# Patient Record
Sex: FEMALE | Race: BLACK OR AFRICAN AMERICAN | NOT HISPANIC OR LATINO | Employment: OTHER | URBAN - METROPOLITAN AREA
[De-identification: names, ages, dates, MRNs, and addresses within clinical notes are randomized per-mention and may not be internally consistent; named-entity substitution may affect disease eponyms.]

---

## 2017-06-14 ENCOUNTER — OFFICE VISIT (OUTPATIENT)
Dept: OBGYN | Facility: CLINIC | Age: 27
End: 2017-06-14
Payer: COMMERCIAL

## 2017-06-14 VITALS
WEIGHT: 187.2 LBS | BODY MASS INDEX: 28.37 KG/M2 | TEMPERATURE: 98.2 F | SYSTOLIC BLOOD PRESSURE: 120 MMHG | HEIGHT: 68 IN | DIASTOLIC BLOOD PRESSURE: 80 MMHG

## 2017-06-14 DIAGNOSIS — Z13.220 SCREENING FOR LIPOID DISORDERS: ICD-10-CM

## 2017-06-14 DIAGNOSIS — Z01.419 ENCOUNTER FOR GYNECOLOGICAL EXAMINATION WITHOUT ABNORMAL FINDING: Primary | ICD-10-CM

## 2017-06-14 DIAGNOSIS — N84.1 CERVICAL POLYP: ICD-10-CM

## 2017-06-14 LAB
ERYTHROCYTE [DISTWIDTH] IN BLOOD BY AUTOMATED COUNT: 13.3 % (ref 10–15)
HCT VFR BLD AUTO: 42 % (ref 35–47)
HGB BLD-MCNC: 14 G/DL (ref 11.7–15.7)
MCH RBC QN AUTO: 29.2 PG (ref 26.5–33)
MCHC RBC AUTO-ENTMCNC: 33.3 G/DL (ref 31.5–36.5)
MCV RBC AUTO: 88 FL (ref 78–100)
PLATELET # BLD AUTO: 254 10E9/L (ref 150–450)
RBC # BLD AUTO: 4.79 10E12/L (ref 3.8–5.2)
WBC # BLD AUTO: 5 10E9/L (ref 4–11)

## 2017-06-14 PROCEDURE — 88305 TISSUE EXAM BY PATHOLOGIST: CPT | Performed by: OBSTETRICS & GYNECOLOGY

## 2017-06-14 PROCEDURE — 99385 PREV VISIT NEW AGE 18-39: CPT | Mod: 25 | Performed by: OBSTETRICS & GYNECOLOGY

## 2017-06-14 PROCEDURE — 80061 LIPID PANEL: CPT | Performed by: OBSTETRICS & GYNECOLOGY

## 2017-06-14 PROCEDURE — G0145 SCR C/V CYTO,THINLAYER,RESCR: HCPCS | Performed by: OBSTETRICS & GYNECOLOGY

## 2017-06-14 PROCEDURE — 36415 COLL VENOUS BLD VENIPUNCTURE: CPT | Performed by: OBSTETRICS & GYNECOLOGY

## 2017-06-14 PROCEDURE — 85027 COMPLETE CBC AUTOMATED: CPT | Performed by: OBSTETRICS & GYNECOLOGY

## 2017-06-14 PROCEDURE — 57500 BIOPSY OF CERVIX: CPT | Performed by: OBSTETRICS & GYNECOLOGY

## 2017-06-14 NOTE — MR AVS SNAPSHOT
"              After Visit Summary   6/14/2017    Jono Gillis    MRN: [id number]           Patient Information     Date Of Birth          1990        Visit Information        Provider Department      6/14/2017 8:30 AM Radha Renteria MD [facility]        Today's Diagnoses     Encounter for gynecological examination without abnormal finding    -  1    Screening for lipoid disorders        Cervical polyp           Follow-ups after your visit        Who to contact     If you have questions or need follow up information about today's clinic visit or your schedule please contact [facility] directly at [phone].  Normal or non-critical lab and imaging results will be communicated to you by MyChart, letter or phone within 4 business days after the clinic has received the results. If you do not hear from us within 7 days, please contact the clinic through 7digitalhart or phone. If you have a critical or abnormal lab result, we will notify you by phone as soon as possible.  Submit refill requests through Superfly or call your pharmacy and they will forward the refill request to us. Please allow 3 business days for your refill to be completed.          Additional Information About Your Visit        MyChart Information     Superfly gives you secure access to your electronic health record. If you see a primary care provider, you can also send messages to your care team and make appointments. If you have questions, please call your primary care clinic.  If you do not have a primary care provider, please call [phone] and they will assist you.        Care EveryWhere ID     This is your Care EveryWhere ID. This could be used by other organizations to access your [place] medical records  [id number]        Your Vitals Were     Temperature Height Last Period BMI (Body Mass Index)          98.2  F (36.8  C) (Oral) 5' 8\" (1.727 m) 05/26/2017 28.46 kg/m2         Blood Pressure " from Last 3 Encounters:   06/14/17 120/80    Weight from Last 3 Encounters:   06/14/17 187 lb 3.2 oz (84.9 kg)              We Performed the Following     BIOPSY/EXCIS CERVICAL LESION W/WO FULGURATION     CBC with platelets     Lipid Profile     Pap imaged thin layer screen reflex to HPV if ASCUS - recommend age 25 - 29     Surgical pathology exam        Primary Care Provider    None Specified       No primary provider on file.        Thank you!     Thank you for choosing [facility]  for your care. Our goal is always to provide you with excellent care. Hearing back from our patients is one way we can continue to improve our services. Please take a few minutes to complete the written survey that you may receive in the mail after your visit with us. Thank you!             Your Updated Medication List - Protect others around you: Learn how to safely use, store and throw away your medicines at www.disposemymeds.org.      Notice  As of 6/14/2017  1:29 PM    You have not been prescribed any medications.

## 2017-06-14 NOTE — NURSING NOTE
Chief Complaint   Patient presents with     Physical     pap NIL Spring of 2015 at [facility]       Initial There were no vitals taken for this visit. There is no height or weight on file to calculate BMI.  BP completed using cuff size: regular        The following HM Due: NONE      The following patient reported/Care Every where data was sent to:  P ABSTRACT QUALITY INITIATIVES [[id number]]  Pap smear done on this date: spring of 2015 (approximately), by this group: [facility], results were NIL.      patient has appointment for today and orders have been placed

## 2017-06-14 NOTE — Clinical Note
Please abstract the following data from this visit with this patient into the appropriate field in Epic:  Pap smear done on this date: spring of 2015 (approximately), by this group: [facility], results were NIL.

## 2017-06-14 NOTE — PROGRESS NOTES
Jono is a 27 year old  female who presents for annual exam.     Menses are regular q 28-30 days and normal lasting 7 days.  Menses flow: normal.  Patient's last menstrual period was 2017.. Using condoms for contraception.  She is not currently considering pregnancy.  Besides routine health maintenance, she has no other health concerns today .    2016 and not planning kids until she turns 29 probably.   Cycles have a lot of cramping, mak first day of cycle where she tries to work from home if possible. Has noted some spotting just prior to cycle with last 2 menses.  Fasting today and would like cholesterol check.  GYNECOLOGIC HISTORY:  Menarche:   Jono is sexually active with 1male partner(s) and is currently in monogamous relationship.    History sexually transmitted infections:No STD history  STI testing offered?  Declined  MARTIN exposure: Unknown  History of abnormal Pap smear: NO - age 21-29 PAP every 3 years recommended  Family history of breast CA: Yes (Please explain): mgm and   Family history of uterine/ovarian CA: No    Family history of colon CA: No    HEALTH MAINTENANCE:  Cholesterol: (No results found for: CHOL History of abnormal lipids: No  Mammo: na . History of abnormal Mammo: Not applicable.  Regular Self Breast Exams: No  Calcium/Vitamin D intake: source:  none Adequate? No  TSH: (No results found for: TSH )  Pap; (No results found for: PAP )    HISTORY:  Obstetric History       T0      L0     SAB0   TAB0   Ectopic0   Multiple0   Live Births0         Past Medical History:   Diagnosis Date     Anxiety     homeopathic remedies, therapy     Past Surgical History:   Procedure Laterality Date     DENTAL SURGERY  2013    wisdom teeth     Family History   Problem Relation Age of Onset     Anxiety Disorder Mother      Depression Mother      Breast Cancer Maternal Grandmother      late 70's, not genetic     Breast Cancer Maternal Aunt      mid 50's     Anxiety  "Disorder Maternal Aunt      Depression Maternal Aunt      Social History     Social History     Marital status:      Spouse name: N/A     Number of children: N/A     Years of education: N/A     Social History Main Topics     Smoking status: Never Smoker     Smokeless tobacco: None     Alcohol use Yes      Comment: occ     Drug use: No     Sexual activity: Yes     Partners: Male     Birth control/ protection: Condom     Other Topics Concern     None     Social History Narrative     None     No current outpatient prescriptions on file.   No Known Allergies    Past medical, surgical, social and family history were reviewed and updated in EPIC.    EXAM:  /80  Temp 98.2  F (36.8  C) (Oral)  Ht 5' 8\" (1.727 m)  Wt 187 lb 3.2 oz (84.9 kg)  LMP 05/26/2017  BMI 28.46 kg/m2   BMI: Body mass index is 28.46 kg/(m^2).  Constitutional: healthy, alert and no distress  Head: Normocephalic. No masses, lesions, tenderness or abnormalities  Neck: Neck supple. Trachea midline. No adenopathy. Thyroid symmetric, normal size.   Cardiovascular: RRR.   Respiratory: Negative.   Breast: Breasts reveal mild symmetric fibrocystic densities, but there are no dominant, discrete, fixed or suspicious masses found.  Gastrointestinal: Abdomen soft, non-tender, non-distended. No masses, organomegaly.  :  Vulva:  No external lesions, normal female hair distribution, no inguinal adenopathy.    Urethra:  Midline, non-tender, well supported, no discharge  Vagina:  Moist, pink, no abnormal discharge, very large cervical polyp seen about 5 x 3 cm in vault  Uterus:  Normal size , non-tender, freely mobile  Ovaries:  No masses appreciated, non-tender, mobile  Rectal Exam: deferred  Musculoskeletal: extremities normal  Skin: no suspicious lesions or rashes  Psychiatric: Affect appropriate, cooperative,mentation appears normal.     Procedure:  After informed consent was obtained from the patient, betadine and then xylocaine 2% jelly was " applied to the polyp.  Ring forcep used to grasp the polyp and was twisted off. Specimen was submitted to pathology. Patient tolerated the procedure well. EBL minimal.  Silver nitrate used for hemostasis    COUNSELING:   Reviewed preventive health counseling, as reflected in patient instructions       Family planning   reports that she has never smoked. She does not have any smokeless tobacco history on file.    Body mass index is 28.46 kg/(m^2).  Weight management plan: not addressed today  FRAX Risk Assessment    ASSESSMENT:  27 year old female with satisfactory annual exam  (Z01.419) Encounter for gynecological examination without abnormal finding  (primary encounter diagnosis)  Comment: condoms  Plan: CBC with platelets, Pap imaged thin layer         screen reflex to HPV if ASCUS - recommend age         25 - 29        Discussed q3 yr pap smear.  Reviewed scheduled ibuprofen with menses for cramping.    (Z13.220) Screening for lipoid disorders  Comment: not done prior  Plan: Lipid Profile        Fasting today    (N84.1) Cervical polyp  Comment: very large  Plan: Surgical pathology exam, BIOPSY/EXCIS CERVICAL         LESION W/WO FULGURATION        Will let her know path results when available, but discussed likely cycles would be better after removed.  If still with spotting, then consider u/s to check endometrium for other polyps.    Radha Renteria MD

## 2017-06-15 LAB
CHOLEST SERPL-MCNC: 200 MG/DL
HDLC SERPL-MCNC: 72 MG/DL
LDLC SERPL CALC-MCNC: 119 MG/DL
NONHDLC SERPL-MCNC: 128 MG/DL
TRIGL SERPL-MCNC: 45 MG/DL

## 2017-06-16 LAB
COPATH REPORT: NORMAL
COPATH REPORT: NORMAL
PAP: NORMAL

## 2017-07-31 ENCOUNTER — MYC MEDICAL ADVICE (OUTPATIENT)
Dept: OBGYN | Facility: CLINIC | Age: 27
End: 2017-07-31

## 2017-07-31 ENCOUNTER — E-VISIT (OUTPATIENT)
Dept: OBGYN | Facility: CLINIC | Age: 27
End: 2017-07-31
Payer: COMMERCIAL

## 2017-07-31 DIAGNOSIS — F41.9 ANXIETY: Primary | ICD-10-CM

## 2017-07-31 PROCEDURE — 99444 ZZC PHYSICIAN ONLINE EVALUATION & MANAGEMENT SERVICE: CPT | Performed by: OBSTETRICS & GYNECOLOGY

## 2017-07-31 ASSESSMENT — ANXIETY QUESTIONNAIRES
7. FEELING AFRAID AS IF SOMETHING AWFUL MIGHT HAPPEN: NEARLY EVERY DAY
7. FEELING AFRAID AS IF SOMETHING AWFUL MIGHT HAPPEN: NEARLY EVERY DAY
6. BECOMING EASILY ANNOYED OR IRRITABLE: NEARLY EVERY DAY
GAD7 TOTAL SCORE: 20
4. TROUBLE RELAXING: NEARLY EVERY DAY
GAD7 TOTAL SCORE: 20
5. BEING SO RESTLESS THAT IT IS HARD TO SIT STILL: MORE THAN HALF THE DAYS
2. NOT BEING ABLE TO STOP OR CONTROL WORRYING: NEARLY EVERY DAY
3. WORRYING TOO MUCH ABOUT DIFFERENT THINGS: NEARLY EVERY DAY
GAD7 TOTAL SCORE: 20
1. FEELING NERVOUS, ANXIOUS, OR ON EDGE: NEARLY EVERY DAY

## 2017-07-31 ASSESSMENT — PATIENT HEALTH QUESTIONNAIRE - PHQ9
SUM OF ALL RESPONSES TO PHQ QUESTIONS 1-9: 20
10. IF YOU CHECKED OFF ANY PROBLEMS, HOW DIFFICULT HAVE THESE PROBLEMS MADE IT FOR YOU TO DO YOUR WORK, TAKE CARE OF THINGS AT HOME, OR GET ALONG WITH OTHER PEOPLE: EXTREMELY DIFFICULT
SUM OF ALL RESPONSES TO PHQ QUESTIONS 1-9: 20

## 2017-07-31 NOTE — TELEPHONE ENCOUNTER
Can we have her do an Evisit and also see if she can get in with [facility] therapist??  Then I can go over side effects and prescribe something for her.     (they have someone at  that does talk therapy)  I am not sure what the order is under for [facility]?    Thanks  Rdaha Renteria MD

## 2017-07-31 NOTE — TELEPHONE ENCOUNTER
No pharmacy on file    Weird as they are not supposed to able to submit this without a pharmacy.    Radha Renteria MD

## 2017-07-31 NOTE — TELEPHONE ENCOUNTER
Had patient fill out questionnaires.    PHQ-9 SCORE 7/31/2017   Total Score MyChart 20 (Severe depression)   Total Score 20     LALY-7 SCORE 7/31/2017   Total Score 20 (severe anxiety)   Total Score 20       Sade Duncan RN-BSN

## 2017-07-31 NOTE — MR AVS SNAPSHOT
After Visit Summary   7/31/2017    Jono Gillis    MRN: [id number]           Patient Information     Date Of Birth          1990        Visit Information        Provider Department      7/31/2017 11:50 AM Radha Renteria MD [facility]        Today's Diagnoses     Anxiety    -  1       Follow-ups after your visit        Who to contact     If you have questions or need follow up information about today's clinic visit or your schedule please contact [facility] directly at [phone].  Normal or non-critical lab and imaging results will be communicated to you by MtoVhart, letter or phone within 4 business days after the clinic has received the results. If you do not hear from us within 7 days, please contact the clinic through [employer] Multimediat or phone. If you have a critical or abnormal lab result, we will notify you by phone as soon as possible.  Submit refill requests through IDENT Technology or call your pharmacy and they will forward the refill request to us. Please allow 3 business days for your refill to be completed.          Additional Information About Your Visit        MyChart Information     IDENT Technology gives you secure access to your electronic health record. If you see a primary care provider, you can also send messages to your care team and make appointments. If you have questions, please call your primary care clinic.  If you do not have a primary care provider, please call [phone] and they will assist you.        Care EveryWhere ID     This is your Care EveryWhere ID. This could be used by other organizations to access your [place] medical records  [id number]         Blood Pressure from Last 3 Encounters:   06/14/17 120/80    Weight from Last 3 Encounters:   06/14/17 187 lb 3.2 oz (84.9 kg)              Today, you had the following     No orders found for display         Today's Medication Changes          These changes are accurate as of: 7/31/17 11:59  PM.  If you have any questions, ask your nurse or doctor.               Start taking these medicines.        Dose/Directions    sertraline 50 MG tablet   Commonly known as:  ZOLOFT   Used for:  Anxiety   Started by:  Radha Renteria MD        Take 1/2 tablet (25 mg) for 1-2 weeks, then increase to 1 tablet orally daily   Quantity:  30 tablet   Refills:  3            Where to get your medicines      These medications were sent to [facility] - [place], [place] - [address]  [address] [facility], [place] [address]     Phone:  [phone]     sertraline 50 MG tablet                Primary Care Provider    None Specified       No primary provider on file.        Equal Access to Services     [facility] SREEDHAR : Carolina Mixon, raffy mueller, jose ramon clark, sherrie foster. So [facility] [phone].    ATENCIÓN: Si habla español, tiene a connell disposición servicios gratuitos de asistencia lingüística. Llame al [phone].    We comply with applicable federal civil rights laws and [place] laws. We do not discriminate on the basis of race, color, national origin, age, disability sex, sexual orientation or gender identity.            Thank you!     Thank you for choosing [facility]  for your care. Our goal is always to provide you with excellent care. Hearing back from our patients is one way we can continue to improve our services. Please take a few minutes to complete the written survey that you may receive in the mail after your visit with us. Thank you!             Your Updated Medication List - Protect others around you: Learn how to safely use, store and throw away your medicines at www.disposemymeds.org.          This list is accurate as of: 7/31/17 11:59 PM.  Always use your most recent med list.                   Brand Name Dispense Instructions for use Diagnosis    sertraline 50 MG tablet    ZOLOFT    30 tablet    Take 1/2  tablet (25 mg) for 1-2 weeks, then increase to 1 tablet orally daily    Anxiety

## 2017-08-01 ASSESSMENT — ANXIETY QUESTIONNAIRES: GAD7 TOTAL SCORE: 20

## 2017-08-01 ASSESSMENT — PATIENT HEALTH QUESTIONNAIRE - PHQ9: SUM OF ALL RESPONSES TO PHQ QUESTIONS 1-9: 20

## 2017-08-23 ENCOUNTER — OFFICE VISIT (OUTPATIENT)
Dept: FAMILY MEDICINE | Facility: CLINIC | Age: 27
End: 2017-08-23
Payer: OTHER MISCELLANEOUS

## 2017-08-23 VITALS
TEMPERATURE: 97.7 F | OXYGEN SATURATION: 97 % | RESPIRATION RATE: 18 BRPM | BODY MASS INDEX: 28.51 KG/M2 | HEART RATE: 84 BPM | WEIGHT: 188.13 LBS | DIASTOLIC BLOOD PRESSURE: 87 MMHG | HEIGHT: 68 IN | SYSTOLIC BLOOD PRESSURE: 131 MMHG
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
## 2017-08-23 DIAGNOSIS — M54.2 NECK PAIN: Primary | ICD-10-CM

## 2017-08-23 PROCEDURE — 99214 OFFICE O/P EST MOD 30 MIN: CPT | Performed by: NURSE PRACTITIONER
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
## 2017-09-08 ENCOUNTER — TELEPHONE (OUTPATIENT)
Dept: FAMILY MEDICINE | Facility: CLINIC | Age: 27
End: 2017-09-08

## 2017-09-08 NOTE — TELEPHONE ENCOUNTER
Left message on answering machine for patient to call back so we can find out why she is out of work.  Paloma Rao RN

## 2017-09-08 NOTE — TELEPHONE ENCOUNTER
Reason for Call:  Form, our goal is to have forms completed with 72 hours, however, some forms may require a visit or additional information.    Type of letter, form or note:  work   Patient needs letter stating that she is able to return to work effective immediately.  Fax letter to [employer], fax #  [phone]  Atten:  Jael Abarca    Additional comments: Please call pt when form has been faxed or if you have any questions. OK to leave message on voicemail.    Call taken on 9/8/2017 at 1:59 PM by Jana Hardy
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
## 2017-09-08 NOTE — LETTER
[facility]   [address] [place], [place]  [address]  [phone]        September 13, 2017      RE: Jono Gillis  [address] [place] [address]        To whom it may concern:    Jono Gillis is under my professional care. She was seen and evaluated in the clinic on 8/23/17 and was cleared to return to work without restrictions on 8/24/17.    Please call if you have any questions.               Sincerely,    Roseanna Ramirez, GIOVANI/hsg

## 2018-08-07 ENCOUNTER — OFFICE VISIT (OUTPATIENT)
Dept: OBGYN | Facility: CLINIC | Age: 28
End: 2018-08-07
Payer: COMMERCIAL

## 2018-08-07 VITALS
WEIGHT: 192 LBS | DIASTOLIC BLOOD PRESSURE: 80 MMHG | BODY MASS INDEX: 29.1 KG/M2 | HEIGHT: 68 IN | SYSTOLIC BLOOD PRESSURE: 110 MMHG

## 2018-08-07 DIAGNOSIS — Z01.419 ENCOUNTER FOR GYNECOLOGICAL EXAMINATION WITHOUT ABNORMAL FINDING: Primary | ICD-10-CM

## 2018-08-07 DIAGNOSIS — Z23 NEED FOR TDAP VACCINATION: ICD-10-CM

## 2018-08-07 PROCEDURE — 90715 TDAP VACCINE 7 YRS/> IM: CPT | Performed by: OBSTETRICS & GYNECOLOGY

## 2018-08-07 PROCEDURE — 90471 IMMUNIZATION ADMIN: CPT | Performed by: OBSTETRICS & GYNECOLOGY

## 2018-08-07 PROCEDURE — 99395 PREV VISIT EST AGE 18-39: CPT | Mod: 25 | Performed by: OBSTETRICS & GYNECOLOGY

## 2018-08-07 ASSESSMENT — ANXIETY QUESTIONNAIRES
6. BECOMING EASILY ANNOYED OR IRRITABLE: SEVERAL DAYS
GAD7 TOTAL SCORE: 11
IF YOU CHECKED OFF ANY PROBLEMS ON THIS QUESTIONNAIRE, HOW DIFFICULT HAVE THESE PROBLEMS MADE IT FOR YOU TO DO YOUR WORK, TAKE CARE OF THINGS AT HOME, OR GET ALONG WITH OTHER PEOPLE: SOMEWHAT DIFFICULT
2. NOT BEING ABLE TO STOP OR CONTROL WORRYING: MORE THAN HALF THE DAYS
3. WORRYING TOO MUCH ABOUT DIFFERENT THINGS: MORE THAN HALF THE DAYS
5. BEING SO RESTLESS THAT IT IS HARD TO SIT STILL: NOT AT ALL
7. FEELING AFRAID AS IF SOMETHING AWFUL MIGHT HAPPEN: MORE THAN HALF THE DAYS
1. FEELING NERVOUS, ANXIOUS, OR ON EDGE: MORE THAN HALF THE DAYS

## 2018-08-07 ASSESSMENT — PATIENT HEALTH QUESTIONNAIRE - PHQ9: 5. POOR APPETITE OR OVEREATING: MORE THAN HALF THE DAYS

## 2018-08-07 NOTE — Clinical Note
Please check in with her via my chart in 6-8 weeks and make sure she scheduled a therapy appointment.  Connor 7 and PHQ 9 slightly increased.  Thanks! libia

## 2018-08-07 NOTE — PROGRESS NOTES
Jono is a 28 year old  female who presents for annual exam.     Menses are regular q 28-30 days and normal lasting 6 days.  Menses flow: normal.  Patient's last menstrual period was 2018.. Using condoms for contraception.  She is not currently considering pregnancy.  Not planning kids for a few more years.  Besides routine health maintenance,  she would like to discuss periods issues--wants to make sure she does not have another cervical polyp.  In 2017 she had gotten  started grad school in a new job in when I had seen her had high anxiety so was started on Zoloft.  She noticed on Zoloft she had an increase in panic attacks and was seeing a therapist 2017 but due to cost concerns had to stop.  By 2017 she did stop taking the Zoloft and did quit her job and since then has noted no more panic attacks.  She still having some issues in today felix 7=11 and PHQ 9=8  This is much less than last time I saw her.  She would like to avoid medication.  GYNECOLOGIC HISTORY:  Menarche:   Joon is sexually active with 1male partner(s) and is currently in monogamous relationship .    History sexually transmitted infections:No STD history  STI testing offered?  Declined  MARTIN exposure: Unknown  History of abnormal Pap smear: NO - age 21-29 PAP every 3 years recommended  Family history of breast CA: Yes (Please explain): mgm and m-aunt  Family history of uterine/ovarian CA: No    Family history of colon CA: No    HEALTH MAINTENANCE:  Cholesterol: (  Cholesterol   Date Value Ref Range Status   2017 200 (H) <200 mg/dL Final     Comment:     Desirable:       <200 mg/dl    History of abnormal lipids: Yes  Mammo: na . History of abnormal Mammo: Not applicable.  Regular Self Breast Exams: Yes  Calcium/Vitamin D intake: source:  none Adequate? No  TSH: (No results found for: TSH )  Pap; (  Lab Results   Component Value Date    PAP NIL 2017    )    HISTORY:  Obstetric History    G0    "P0   T0      L0     SAB0   TAB0   Ectopic0   Multiple0   Live Births0         Past Medical History:   Diagnosis Date     Anxiety     homeopathic remedies, therapy     Past Surgical History:   Procedure Laterality Date     DENTAL SURGERY  2013    wisdom teeth     Family History   Problem Relation Age of Onset     Anxiety Disorder Mother      Depression Mother      Breast Cancer Maternal Grandmother      late 70's, not genetic     Breast Cancer Maternal Aunt      mid 50's     Anxiety Disorder Maternal Aunt      Depression Maternal Aunt      Social History     Social History     Marital status:      Spouse name: N/A     Number of children: 0     Years of education: N/A     Occupational History     director of student programs      [employer]     Social History Main Topics     Smoking status: Never Smoker     Smokeless tobacco: Never Used     Alcohol use Yes      Comment: occ     Drug use: No     Sexual activity: Yes     Partners: Male     Birth control/ protection: Condom     Other Topics Concern     Not on file     Social History Narrative       Current Outpatient Prescriptions:      sertraline (ZOLOFT) 100 MG tablet, Take 1 tablet (100 mg) by mouth daily (Patient not taking: Reported on 2018), Disp: 90 tablet, Rfl: 3     sertraline (ZOLOFT) 50 MG tablet, Take 1/2 tablet (25 mg) for 1-2 weeks, then increase to 1 tablet orally daily (Patient not taking: Reported on 2018), Disp: 30 tablet, Rfl: 3   No Known Allergies    Past medical, surgical, social and family history were reviewed and updated in EPIC.      EXAM:  /80  Ht 5' 8\" (1.727 m)  Wt 192 lb (87.1 kg)  LMP 2018  BMI 29.19 kg/m2   BMI: Body mass index is 29.19 kg/(m^2).  Constitutional: healthy, alert and no distress  Head: Normocephalic. No masses, lesions, tenderness or abnormalities  Neck: Neck supple. Trachea midline. No adenopathy. Thyroid symmetric, normal size.   Cardiovascular: RRR.   Respiratory: " Negative.   Breast: Breasts reveal mild symmetric fibrocystic densities, but there are no dominant, discrete, fixed or suspicious masses found.  Gastrointestinal: Abdomen soft, non-tender, non-distended. No masses, organomegaly.  :  Vulva:  No external lesions, normal female hair distribution, no inguinal adenopathy.    Urethra:  Midline, non-tender, well supported, no discharge  Vagina:  Moist, pink, no abnormal discharge, no lesions  Uterus:  Normal size, no cervical polyp seen , non-tender, freely mobile  Ovaries:  No masses appreciated, non-tender, mobile  Rectal Exam: deferred  Musculoskeletal: extremities normal  Skin: no suspicious lesions or rashes  Psychiatric: Affect appropriate, cooperative,mentation appears normal.     COUNSELING:   Reviewed preventive health counseling, as reflected in patient instructions   reports that she has never smoked. She has never used smokeless tobacco.    Body mass index is 29.19 kg/(m^2).  Weight management plan: not discussed  FRAX Risk Assessment    ASSESSMENT:  28 year old female with satisfactory annual exam  (Z01.419) Encounter for gynecological examination without abnormal finding  (primary encounter diagnosis)  Comment: Condoms  Plan: Not due for Pap smear.  Discussed her anxiety which is less than last year.  She is willing to go see a therapist.  Will check back with her in 6-8 weeks and make sure she did schedule appointment for therapy.    (Z23) Need for Tdap vaccination  Plan: TDAP, IM (10 - 64 YRS) - Adacel, INJECTION         INTRAMUSCULAR OR SUB-Q    Radha Renteria MD

## 2018-08-07 NOTE — MR AVS SNAPSHOT
"              After Visit Summary   8/7/2018    Jono Gillis    MRN: [id number]           Patient Information     Date Of Birth          1990        Visit Information        Provider Department      8/7/2018 1:30 PM Radha Renteria MD [facility]        Today's Diagnoses     Encounter for gynecological examination without abnormal finding    -  1    Need for Tdap vaccination           Follow-ups after your visit        Who to contact     If you have questions or need follow up information about today's clinic visit or your schedule please contact [facility] directly at [phone].  Normal or non-critical lab and imaging results will be communicated to you by MyChart, letter or phone within 4 business days after the clinic has received the results. If you do not hear from us within 7 days, please contact the clinic through [employer] or phone. If you have a critical or abnormal lab result, we will notify you by phone as soon as possible.  Submit refill requests through [employer] or call your pharmacy and they will forward the refill request to us. Please allow 3 business days for your refill to be completed.          Additional Information About Your Visit        MyChart Information     [employer] gives you secure access to your electronic health record. If you see a primary care provider, you can also send messages to your care team and make appointments. If you have questions, please call your primary care clinic.  If you do not have a primary care provider, please call [phone] and they will assist you.        Care EveryWhere ID     This is your Care EveryWhere ID. This could be used by other organizations to access your [facility] medical records  [id number]        Your Vitals Were     Height Last Period BMI (Body Mass Index)             5' 8\" (1.727 m) 08/04/2018 29.19 kg/m2          Blood Pressure from Last 3 Encounters:   08/07/18 110/80   08/23/17 131/87 "   06/14/17 120/80    Weight from Last 3 Encounters:   08/07/18 192 lb (87.1 kg)   08/23/17 188 lb 2 oz (85.3 kg)   06/14/17 187 lb 3.2 oz (84.9 kg)              We Performed the Following     INJECTION INTRAMUSCULAR OR SUB-Q     TDAP, IM (10 - 64 YRS) - Adacel          Today's Medication Changes          These changes are accurate as of 8/7/18 11:59 PM.  If you have any questions, ask your nurse or doctor.               Stop taking these medicines if you haven't already. Please contact your care team if you have questions.     sertraline 100 MG tablet   Commonly known as:  ZOLOFT   Stopped by:  Radha Renteria MD           sertraline 50 MG tablet   Commonly known as:  ZOLOFT   Stopped by:  Radha Renteria MD                    Primary Care Provider Fax #    Physician No Ref-Primary [phone]       No address on file        Equal Access to Services     [facility] : Carolina Mixon, waaxda luvladislav, qaybta kaalmada adejovani, sherrie glaser . So [facility] [phone].    ATENCIÓN: Si habla español, tiene a connell disposición servicios gratuitos de asistencia lingüística. Llame al [phone].    We comply with applicable federal civil rights laws and [place] laws. We do not discriminate on the basis of race, color, national origin, age, disability, sex, sexual orientation, or gender identity.            Thank you!     Thank you for choosing [facility]  for your care. Our goal is always to provide you with excellent care. Hearing back from our patients is one way we can continue to improve our services. Please take a few minutes to complete the written survey that you may receive in the mail after your visit with us. Thank you!             Your Updated Medication List - Protect others around you: Learn how to safely use, store and throw away your medicines at www.disposemymeds.org.      Notice  As of 8/7/2018 11:59 PM    You have not been  prescribed any medications.

## 2018-08-07 NOTE — NURSING NOTE
"Chief Complaint   Patient presents with     Physical     Consult     some ? regarding periods       Initial /80  Ht 5' 8\" (1.727 m)  Wt 192 lb (87.1 kg)  LMP 2018  BMI 29.19 kg/m2 Estimated body mass index is 29.19 kg/(m^2) as calculated from the following:    Height as of this encounter: 5' 8\" (1.727 m).    Weight as of this encounter: 192 lb (87.1 kg).  BP completed using cuff size: regular        The following HM Due: NONE      The following patient reported/Care Every where data was sent to:  P ABSTRACT QUALITY INITIATIVES [[id number]]  NA      patient has appointment for today              "

## 2018-08-08 ASSESSMENT — ANXIETY QUESTIONNAIRES: GAD7 TOTAL SCORE: 11

## 2018-08-08 ASSESSMENT — PATIENT HEALTH QUESTIONNAIRE - PHQ9: SUM OF ALL RESPONSES TO PHQ QUESTIONS 1-9: 8

## 2018-10-08 ENCOUNTER — MYC MEDICAL ADVICE (OUTPATIENT)
Dept: NURSING | Facility: CLINIC | Age: 28
End: 2018-10-08

## 2019-01-03 ENCOUNTER — OFFICE VISIT (OUTPATIENT)
Dept: FAMILY MEDICINE | Facility: CLINIC | Age: 29
End: 2019-01-03
Payer: COMMERCIAL

## 2019-01-03 VITALS
TEMPERATURE: 98.1 F | SYSTOLIC BLOOD PRESSURE: 108 MMHG | HEART RATE: 102 BPM | BODY MASS INDEX: 28.28 KG/M2 | OXYGEN SATURATION: 98 % | DIASTOLIC BLOOD PRESSURE: 70 MMHG | WEIGHT: 186 LBS

## 2019-01-03 DIAGNOSIS — J06.9 VIRAL URI: Primary | ICD-10-CM

## 2019-01-03 PROCEDURE — 99213 OFFICE O/P EST LOW 20 MIN: CPT | Performed by: INTERNAL MEDICINE

## 2019-01-03 NOTE — PROGRESS NOTES
SUBJECTIVE:   Jono Gillis is a 28 year old female presenting with a chief complaint of   Chief Complaint   Patient presents with     Sweats     Pt in clinic to have eval for congestion, sore throat, fatigue, and chills.     Nasal Congestion     Pharyngitis     Fatigue       She is an established patient of [facility].    URI Adult    Onset of symptoms was 10 day(s) ago.  Course of illness is waxing and waning.   Now worse     Current and Associated symptoms: chills, stuffy nose, sore throat and headache  Runny nose, sneezy  Treatment measures tried include Tylenol/Ibuprofen and OTC Cough med.  Predisposing factors include ill contact: Work.        Review of Systems    Past Medical History:   Diagnosis Date     Anxiety     homeopathic remedies, therapy     Family History   Problem Relation Age of Onset     Anxiety Disorder Mother      Depression Mother      Breast Cancer Maternal Grandmother         late 70's, not genetic     Breast Cancer Maternal Aunt         mid 50's     Anxiety Disorder Maternal Aunt      Depression Maternal Aunt      Current Outpatient Medications   Medication Sig Dispense Refill     Pseudoephedrine-Ibuprofen  MG TABS        Social History     Tobacco Use     Smoking status: Never Smoker     Smokeless tobacco: Never Used   Substance Use Topics     Alcohol use: Yes     Comment: occ       OBJECTIVE  /70   Pulse 102   Temp 98.1  F (36.7  C) (Oral)   Wt 84.4 kg (186 lb)   SpO2 98%   BMI 28.28 kg/m      Physical Exam   Constitutional: She appears well-developed and well-nourished.   HENT:   Mouth/Throat: Oropharynx is clear and moist. No oropharyngeal exudate.   Tm nl B  PND  Sinus nontender    Cardiovascular: Normal rate, regular rhythm and normal heart sounds.   Pulmonary/Chest: Effort normal and breath sounds normal.   Vitals reviewed.      Labs:  No results found for this or any previous visit (from the past 24 hour(s)).    X-Ray was not done.    ASSESSMENT:      ICD-10-CM     1. Viral URI J06.9         Medical Decision Making:  Discussed appears to have new virus    PLAN:    URI Adult:  Ibuprofen, Fluids, Rest, Saline nasal spray and honey, menthol cough drops,     Followup:    If not improving or if condition worsens, follow up with your Primary Care Provider    Patient Instructions           Patient Education     Viral Upper Respiratory Illness (Adult)  You have a viral upper respiratory illness (URI), which is another term for the common cold. This illness is contagious during the first few days. It is spread through the air by coughing and sneezing. It may also be spread by direct contact (touching the sick person and then touching your own eyes, nose, or mouth). Frequent handwashing will decrease risk of spread. Most viral illnesses go away within 7 to 10 days with rest and simple home remedies. Sometimes the illness may last for several weeks. Antibiotics will not kill a virus, and they are generally not prescribed for this condition.    Home care    If symptoms are severe, rest at home for the first 2 to 3 days. When you resume activity, don't let yourself get too tired.    Don't smoke. If you need help stopping, talk with your healthcare provider.    Avoid being exposed to cigarette smoke (yours or others ).    You may use acetaminophen or ibuprofen to control pain and fever, unless another medicine was prescribed. If you have chronic liver or kidney disease, have ever had a stomach ulcer or gastrointestinal bleeding, or are taking blood-thinning medicines, talk with your healthcare provider before using these medicines. Aspirin should never be given to anyone under 18 years of age who is ill with a viral infection or fever. It may cause severe liver or brain damage.    Your appetite may be poor, so a light diet is fine. Stay well hydrated by drinking 6 to 8 glasses of fluids per day (water, soft drinks, juices, tea, or soup). Extra fluids will help loosen secretions in the  nose and lungs.    Over-the-counter cold medicines will not shorten the length of time you re sick, but they may be helpful for the following symptoms: cough, sore throat, and nasal and sinus congestion. If you take prescription medicines, ask your healthcare provider or pharmacist which over-the-counter medicines are safe to use. (Note: Don't use decongestants if you have high blood pressure.)  Follow-up care  Follow up with your healthcare provider, or as advised.  When to seek medical advice  Call your healthcare provider right away if any of these occur:    Cough with lots of colored sputum (mucus)    Severe headache; face, neck, or ear pain    Difficulty swallowing due to throat pain    Fever of 100.4 F (38 C) or higher, or as directed by your healthcare provider  Call 911  Call 911 if any of these occur:    Chest pain, shortness of breath, wheezing, or difficulty breathing    Coughing up blood    Very severe pain with swallowing, especially if it goes along with a muffled voice   Date Last Reviewed: 6/1/2018 2000-2018 [employer]. [address] [place] [address], [place] [address]. All rights reserved. This information is not intended as a substitute for professional medical care. Always follow your healthcare professional's instructions.

## 2019-09-03 ENCOUNTER — OFFICE VISIT (OUTPATIENT)
Dept: OBGYN | Facility: CLINIC | Age: 29
End: 2019-09-03
Payer: COMMERCIAL

## 2019-09-03 VITALS
DIASTOLIC BLOOD PRESSURE: 79 MMHG | WEIGHT: 179.2 LBS | HEART RATE: 80 BPM | SYSTOLIC BLOOD PRESSURE: 120 MMHG | OXYGEN SATURATION: 100 % | BODY MASS INDEX: 27.16 KG/M2 | HEIGHT: 68 IN

## 2019-09-03 DIAGNOSIS — I78.1 NEVUS, NON-NEOPLASTIC: ICD-10-CM

## 2019-09-03 DIAGNOSIS — Z13.220 SCREENING FOR LIPID DISORDERS: ICD-10-CM

## 2019-09-03 DIAGNOSIS — F41.9 ANXIETY: ICD-10-CM

## 2019-09-03 DIAGNOSIS — Z01.419 ENCOUNTER FOR GYNECOLOGICAL EXAMINATION WITHOUT ABNORMAL FINDING: Primary | ICD-10-CM

## 2019-09-03 DIAGNOSIS — Z11.3 SCREEN FOR STD (SEXUALLY TRANSMITTED DISEASE): ICD-10-CM

## 2019-09-03 DIAGNOSIS — N94.89 VAGINAL BURNING: ICD-10-CM

## 2019-09-03 LAB
ERYTHROCYTE [DISTWIDTH] IN BLOOD BY AUTOMATED COUNT: 12.5 % (ref 10–15)
HCT VFR BLD AUTO: 40 % (ref 35–47)
HGB BLD-MCNC: 13.5 G/DL (ref 11.7–15.7)
MCH RBC QN AUTO: 29 PG (ref 26.5–33)
MCHC RBC AUTO-ENTMCNC: 33.8 G/DL (ref 31.5–36.5)
MCV RBC AUTO: 86 FL (ref 78–100)
PLATELET # BLD AUTO: 265 10E9/L (ref 150–450)
RBC # BLD AUTO: 4.65 10E12/L (ref 3.8–5.2)
SPECIMEN SOURCE: NORMAL
WBC # BLD AUTO: 7.5 10E9/L (ref 4–11)
WET PREP SPEC: NORMAL

## 2019-09-03 PROCEDURE — 36415 COLL VENOUS BLD VENIPUNCTURE: CPT | Performed by: OBSTETRICS & GYNECOLOGY

## 2019-09-03 PROCEDURE — 99395 PREV VISIT EST AGE 18-39: CPT | Mod: E3 | Performed by: OBSTETRICS & GYNECOLOGY

## 2019-09-03 PROCEDURE — 87210 SMEAR WET MOUNT SALINE/INK: CPT | Performed by: OBSTETRICS & GYNECOLOGY

## 2019-09-03 PROCEDURE — 87591 N.GONORRHOEAE DNA AMP PROB: CPT | Performed by: OBSTETRICS & GYNECOLOGY

## 2019-09-03 PROCEDURE — 87491 CHLMYD TRACH DNA AMP PROBE: CPT | Performed by: OBSTETRICS & GYNECOLOGY

## 2019-09-03 PROCEDURE — 80061 LIPID PANEL: CPT | Performed by: OBSTETRICS & GYNECOLOGY

## 2019-09-03 PROCEDURE — 85027 COMPLETE CBC AUTOMATED: CPT | Performed by: OBSTETRICS & GYNECOLOGY

## 2019-09-03 RX ORDER — ESCITALOPRAM OXALATE 10 MG/1
10 TABLET ORAL DAILY
Qty: 90 TABLET | Refills: 3 | Status: SHIPPED | OUTPATIENT
Start: 2019-09-03 | End: 2020-12-08

## 2019-09-03 ASSESSMENT — PATIENT HEALTH QUESTIONNAIRE - PHQ9
5. POOR APPETITE OR OVEREATING: NEARLY EVERY DAY
SUM OF ALL RESPONSES TO PHQ QUESTIONS 1-9: 16

## 2019-09-03 ASSESSMENT — ANXIETY QUESTIONNAIRES
6. BECOMING EASILY ANNOYED OR IRRITABLE: MORE THAN HALF THE DAYS
7. FEELING AFRAID AS IF SOMETHING AWFUL MIGHT HAPPEN: NEARLY EVERY DAY
5. BEING SO RESTLESS THAT IT IS HARD TO SIT STILL: MORE THAN HALF THE DAYS
3. WORRYING TOO MUCH ABOUT DIFFERENT THINGS: NEARLY EVERY DAY
GAD7 TOTAL SCORE: 18
2. NOT BEING ABLE TO STOP OR CONTROL WORRYING: NEARLY EVERY DAY
1. FEELING NERVOUS, ANXIOUS, OR ON EDGE: MORE THAN HALF THE DAYS
IF YOU CHECKED OFF ANY PROBLEMS ON THIS QUESTIONNAIRE, HOW DIFFICULT HAVE THESE PROBLEMS MADE IT FOR YOU TO DO YOUR WORK, TAKE CARE OF THINGS AT HOME, OR GET ALONG WITH OTHER PEOPLE: SOMEWHAT DIFFICULT

## 2019-09-03 ASSESSMENT — MIFFLIN-ST. JEOR: SCORE: 1581.35

## 2019-09-03 NOTE — PROGRESS NOTES
"Chief Complaint   Patient presents with     Physical       Initial /79 (BP Location: Left arm, Patient Position: Sitting, Cuff Size: Adult Regular)   Pulse 80   Ht 1.727 m (5' 8\")   Wt 81.3 kg (179 lb 3.2 oz)   LMP 2019   SpO2 100%   Breastfeeding? No   BMI 27.25 kg/m   Estimated body mass index is 27.25 kg/m  as calculated from the following:    Height as of this encounter: 1.727 m (5' 8\").    Weight as of this encounter: 81.3 kg (179 lb 3.2 oz).  BP completed using cuff size: regular    Questioned patient about current smoking habits.  Pt. has never smoked.          The following HM Due: NONE      The following patient reported/Care Every where data was sent to:  P ABSTRACT QUALITY INITIATIVES [45602]  n/a      n/a and patient has appointment for today    Jono is a 29 year old  female who presents for annual exam.     Menses are regular q 28-30 days and crampy, heavy and painful lasting 5-8 days.  Menses flow: heavy.  Patient's last menstrual period was 2019.. Using condoms for contraception.  She is not currently considering pregnancy.  Besides routine health maintenance,  she would like to discuss left side cramping mid cycle, mental health.  States she is feeling cramping on her left side during ovulation and after this will have some PMS symptoms.  Wants to know if that is normal?  Feels that her mental health is worse and that she should be on a medication.  We had tried her on Zoloft which seemed to increase her panic attacks and her spouse is currently taking BuSpar.  She is seeing a therapist.  PHQ and felix are increased this year compared to last year.  She denies suicidal ideation.  Weight is down 13 pounds since last year and she has been working on it.  Notes that for approximately a few hours after intercourse, vagina will feel burning and itchy so wants to be evaluated for any vaginal infections today.  Has mole on her left leg that she wants looked " at.  GYNECOLOGIC HISTORY:  Menarche: 11  Age at first intercourse: 26 Number of lifetime partners: 1  Jono is sexually active with male partner(s) and is currently in monogamous relationship with .    History sexually transmitted infections:No STD history  STI testing offered?  Accepted  MARTIN exposure: No  History of abnormal Pap smear: NO - age 30- 65 PAP every 3 years recommended  Family history of breast CA: Yes (Please explain): maternal grandmother, maternal aunt  Family history of uterine/ovarian CA: No    Family history of colon CA: No    HEALTH MAINTENANCE:  Cholesterol: (  Cholesterol   Date Value Ref Range Status   2017 200 (H) <200 mg/dL Final     Comment:     Desirable:       <200 mg/dl    History of abnormal lipids: Yes, little high  Mammo: n/a . History of abnormal Mammo: Not applicable.  Regular Self Breast Exams: Yes  Calcium/Vitamin D intake: source:  Green leafy veggies, vegetable based milks Adequate? maybe  TSH: (No results found for: TSH )  Pap; (  Lab Results   Component Value Date    PAP NIL 2017    )    HISTORY:  OB History    Para Term  AB Living   0 0 0 0 0 0   SAB TAB Ectopic Multiple Live Births   0 0 0 0 0     Past Medical History:   Diagnosis Date     Anxiety     homeopathic remedies, therapy     Past Surgical History:   Procedure Laterality Date     DENTAL SURGERY  2013    wisdom teeth     Family History   Problem Relation Age of Onset     Anxiety Disorder Mother      Depression Mother      Breast Cancer Maternal Grandmother         late 70's, not genetic     Breast Cancer Maternal Aunt         mid 50's     Anxiety Disorder Maternal Aunt      Depression Maternal Aunt      Social History     Socioeconomic History     Marital status:      Spouse name: None     Number of children: 0     Years of education: None     Highest education level: None   Occupational History     Occupation: director of student programs     Comment: [employer]   Social Needs     Financial resource strain: None     Food insecurity:     Worry: None     Inability: None     Transportation needs:     Medical: None     Non-medical: None   Tobacco Use     Smoking status: Never Smoker     Smokeless tobacco: Never Used   Substance and Sexual Activity     Alcohol use: Yes     Comment: occ     Drug use: No     Sexual activity: Yes     Partners: Male     Birth control/protection: Condom   Lifestyle     Physical activity:     Days per week: None     Minutes per session: None     Stress: None   Relationships     Social connections:     Talks on phone: None     Gets together: None     Attends Alevism service: None     Active member of club or organization: None     Attends meetings of clubs or organizations: None     Relationship status: None     Intimate partner violence:     Fear of current or ex partner: None     Emotionally abused: None     Physically abused: None     Forced sexual activity: None   Other Topics Concern     Parent/sibling w/ CABG, MI or angioplasty before 65F 55M? Not Asked   Social History Narrative     None       Current Outpatient Medications:      Pseudoephedrine-Ibuprofen  MG TABS, , Disp: , Rfl:    No Known Allergies    Past medical, surgical, social and family history were reviewed and updated in EPIC.    EXAM:  /79 (BP Location: Left arm, Patient Position: Sitting, Cuff Size: Adult Regular)   Pulse 80   Ht 1.727 m (5' 8\")   Wt 81.3 kg (179 lb 3.2 oz)   LMP 08/30/2019   SpO2 100%   Breastfeeding? No   BMI 27.25 kg/m     BMI: Body mass index is 27.25 kg/m .  Constitutional: healthy, alert and no distress  Head: Normocephalic. No masses, lesions, tenderness or abnormalities  Neck: Neck supple. Trachea midline. No adenopathy. Thyroid symmetric, normal size.   Cardiovascular: RRR.   Respiratory: Negative.   Breast: Breasts reveal mild symmetric fibrocystic densities, but there are no dominant, discrete, fixed or suspicious masses " found.  Gastrointestinal: Abdomen soft, non-tender, non-distended. No masses, organomegaly.  :  Vulva:  No external lesions, normal female hair distribution, no inguinal adenopathy.    Urethra:  Midline, non-tender, well supported, no discharge  Vagina:  Moist, pink, no abnormal discharge, no lesions  Uterus:  Normal size , non-tender, freely mobile  Ovaries:  No masses appreciated, non-tender, mobile  Rectal Exam: deferred  Musculoskeletal: extremities normal  Skin: no suspicious lesions or rashes  Psychiatric: Affect appropriate, cooperative,mentation appears normal.     COUNSELING:   Reviewed preventive health counseling, as reflected in patient instructions   reports that she has never smoked. She has never used smokeless tobacco.    Body mass index is 27.25 kg/m .  Weight management plan: Has lost 13 pounds since last year  FRAX Risk Assessment    ASSESSMENT:  29 year old female with satisfactory annual exam  (Z01.419) Encounter for gynecological examination without abnormal finding  (primary encounter diagnosis)  Comment: Condoms  Plan: CBC with platelets        Discussed not trying for kids until she is mentally in a healthy place.    (Z11.3) Screen for STD (sexually transmitted disease)  (N94.9) Vaginal burning  Comment: After sex  Plan: NEISSERIA GONORRHOEA PCR, CHLAMYDIA TRACHOMATIS        PCR, Wet prep        Discussed checking to make sure her condoms do not have spermicide and will check some labs today    (Z13.220) Screening for lipid disorders  Comment: Fasting  Plan: Lipid Profile        Check labs today    (F41.9) Anxiety  Comment: Increased anxiety and depression for over the last year  Plan: escitalopram (LEXAPRO) 10 MG tablet        Continue with her therapist and we will try a new medication.  Side effects were discussed and she will plan an E visit in 6 to 8 weeks to make sure this is appropriate dosing.  Questions were answered.    (I78.1) Nevus, non-neoplastic  Comment: Left leg  Plan:  DERMATOLOGY REFERRAL        Refer to dermatology for full body skin check.         Radha Renteria MD

## 2019-09-03 NOTE — PATIENT INSTRUCTIONS
Plan Evisit in 6-8 weeks please    Let me know if side effects (panic attacks) ASAP and would change med again    Continue with therapist    Check condom box to make sure plain

## 2019-09-04 LAB
C TRACH DNA SPEC QL NAA+PROBE: NEGATIVE
CHOLEST SERPL-MCNC: 196 MG/DL
HDLC SERPL-MCNC: 69 MG/DL
LDLC SERPL CALC-MCNC: 115 MG/DL
N GONORRHOEA DNA SPEC QL NAA+PROBE: NEGATIVE
NONHDLC SERPL-MCNC: 127 MG/DL
SPECIMEN SOURCE: NORMAL
SPECIMEN SOURCE: NORMAL
TRIGL SERPL-MCNC: 58 MG/DL

## 2019-09-04 ASSESSMENT — ANXIETY QUESTIONNAIRES: GAD7 TOTAL SCORE: 18

## 2019-10-15 ENCOUNTER — OFFICE VISIT (OUTPATIENT)
Dept: DERMATOLOGY | Facility: CLINIC | Age: 29
End: 2019-10-15
Attending: OBSTETRICS & GYNECOLOGY
Payer: COMMERCIAL

## 2019-10-15 DIAGNOSIS — D22.9 MULTIPLE BENIGN MELANOCYTIC NEVI: ICD-10-CM

## 2019-10-15 DIAGNOSIS — Z12.83 SCREENING EXAM FOR SKIN CANCER: ICD-10-CM

## 2019-10-15 DIAGNOSIS — D23.9 DERMATOFIBROMA: Primary | ICD-10-CM

## 2019-10-15 ASSESSMENT — PAIN SCALES - GENERAL: PAINLEVEL: NO PAIN (0)

## 2019-10-15 NOTE — NURSING NOTE
Dermatology Rooming Note    Jono Gillis's goals for this visit include:   Chief Complaint   Patient presents with     Skin Check     Mole on leg of concern.     Anastasiia Bonilla, CMA

## 2019-10-15 NOTE — PATIENT INSTRUCTIONS
Today we talked about your dermatofibroma, a benign skin lesion. If it becomes bothersome we can remove it for you, otherwise there is no need to treat it at all.

## 2019-10-15 NOTE — LETTER
10/15/2019       RE: Jono Gillis  [address]  [place] [address]     Dear Colleague,    Thank you for referring your patient, Jono Gillis, to the [facility] DERMATOLOGY at [facility]. Please see a copy of my visit note below.    [facility] Dermatology Note      Dermatology Problem List:  1. Dermatofibroma, L leg  2. Multiple clinically benign nevi    Encounter Date: Oct 15, 2019    CC:  Chief Complaint   Patient presents with     Skin Check     Mole on leg of concern.         History of Present Illness:  Ms. Jono Gillis is a 29 year old female who presents as a referral from Dr. Chopra for evaluation of nevus on her left leg and full body skin check. She says she noticed it around 2010 and has been fairly stable and asymptomatic since then. Recently there was some dry skin overlying it and when she picked it it bled. She also nicked it shaving a few weeks ago. Because of these recent changes her OBGYN recommended it be further evaluation here today.     Otherwise she has no skin concerns. No personal or family history of skin concerns or skin cancers.      Past Medical History:   Patient Active Problem List   Diagnosis     NO ACTIVE PROBLEMS     Past Medical History:   Diagnosis Date     Anxiety     homeopathic remedies, therapy     Past Surgical History:   Procedure Laterality Date     DENTAL SURGERY  2013    wisdom teeth     Mom has had precancerous moles removed in the past.    Social History:  Patient reports that she has never smoked. She has never used smokeless tobacco. She reports current alcohol use. She reports that she does not use drugs.    Family History:  Family History   Problem Relation Age of Onset     Anxiety Disorder Mother      Depression Mother      Breast Cancer Maternal Grandmother         late 70's, not genetic     Breast Cancer Maternal Aunt         mid 50's     Anxiety Disorder Maternal Aunt       Depression Maternal Aunt      Melanoma No family hx of      Skin Cancer No family hx of        Medications:  Current Outpatient Medications   Medication Sig Dispense Refill     busPIRone (BUSPAR) 10 MG tablet Take 1 tablet (10 mg) by mouth 2 times daily 60 tablet 1     Pseudoephedrine-Ibuprofen  MG TABS        escitalopram (LEXAPRO) 10 MG tablet Take 1 tablet (10 mg) by mouth daily (Patient not taking: Reported on 10/15/2019) 90 tablet 3     No Known Allergies      Review of Systems:  -Skin Establ Pt: The patient denies any new rash, pruritus, or lesions that are symptomatic, changing or bleeding, except as per HPI.  -Constitutional: Otherwise feeling well today, in usual state of health.  -HEENT: Patient denies nonhealing oral sores.  -Skin: As above in HPI. No additional skin concerns.    Physical exam:  Vitals: There were no vitals taken for this visit.  GEN: This is a well developed, well-nourished female in no acute distress, in a pleasant mood.    SKIN: Full skin, which includes the head/face, both arms, chest, back, abdomen,both legs, genitalia and/or groin buttocks, digits and/or nails, was examined.  -[place] skin type: IV-V  -multiple scattered pin point light brown macules on trunk and extremities  - raised light brown nodule with stellate central clearing on left leg with +central dimpling  -No other lesions of concern on areas examined.     Impression/Plan:  1. Dermatofibroma, left leg    Benign nature was discussed. No further intervention required at this time.     2. Multiple clinically benign nevi on the trunk and extremities    ABCDs of melanoma were discussed and self skin checks were advised.       CC Radha Renteria MD  [address]  [place] [address] on close of this encounter.  Follow-up prn for new or changing lesions.       Staff Involved:  I, Liana Roberson MS4, saw and examined the patient in the presence of Dr. Yohannes KAM.    Staff Physician:  I was present  with the medical student who participated in the service and in the documentation of the note. I have verified the history and personally performed the physical exam and medical decision making. I agree with the assessment and plan of care as documented in the note.     Nick Sheffield MD  Staff Dermatologist and Dermatopathologist  , Department of Dermatology

## 2019-10-15 NOTE — PROGRESS NOTES
[facility] Dermatology Note      Dermatology Problem List:  1. Dermatofibroma, L leg  2. Multiple clinically benign nevi    Encounter Date: Oct 15, 2019    CC:  Chief Complaint   Patient presents with     Skin Check     Mole on leg of concern.         History of Present Illness:  Ms. Jono Gillis is a 29 year old female who presents as a referral from Dr. Chopra for evaluation of nevus on her left leg and full body skin check. She says she noticed it around 2010 and has been fairly stable and asymptomatic since then. Recently there was some dry skin overlying it and when she picked it it bled. She also nicked it shaving a few weeks ago. Because of these recent changes her OBGYN recommended it be further evaluation here today.     Otherwise she has no skin concerns. No personal or family history of skin concerns or skin cancers.      Past Medical History:   Patient Active Problem List   Diagnosis     NO ACTIVE PROBLEMS     Past Medical History:   Diagnosis Date     Anxiety     homeopathic remedies, therapy     Past Surgical History:   Procedure Laterality Date     DENTAL SURGERY  2013    wisdom teeth     Mom has had precancerous moles removed in the past.    Social History:  Patient reports that she has never smoked. She has never used smokeless tobacco. She reports current alcohol use. She reports that she does not use drugs.    Family History:  Family History   Problem Relation Age of Onset     Anxiety Disorder Mother      Depression Mother      Breast Cancer Maternal Grandmother         late 70's, not genetic     Breast Cancer Maternal Aunt         mid 50's     Anxiety Disorder Maternal Aunt      Depression Maternal Aunt      Melanoma No family hx of      Skin Cancer No family hx of        Medications:  Current Outpatient Medications   Medication Sig Dispense Refill     busPIRone (BUSPAR) 10 MG tablet Take 1 tablet (10 mg) by mouth 2 times daily 60 tablet 1     Pseudoephedrine-Ibuprofen   MG TABS        escitalopram (LEXAPRO) 10 MG tablet Take 1 tablet (10 mg) by mouth daily (Patient not taking: Reported on 10/15/2019) 90 tablet 3     No Known Allergies      Review of Systems:  -Skin Establ Pt: The patient denies any new rash, pruritus, or lesions that are symptomatic, changing or bleeding, except as per HPI.  -Constitutional: Otherwise feeling well today, in usual state of health.  -HEENT: Patient denies nonhealing oral sores.  -Skin: As above in HPI. No additional skin concerns.    Physical exam:  Vitals: There were no vitals taken for this visit.  GEN: This is a well developed, well-nourished female in no acute distress, in a pleasant mood.    SKIN: Full skin, which includes the head/face, both arms, chest, back, abdomen,both legs, genitalia and/or groin buttocks, digits and/or nails, was examined.  -[place] skin type: IV-V  -multiple scattered pin point light brown macules on trunk and extremities  - raised light brown nodule with stellate central clearing on left leg with +central dimpling  -No other lesions of concern on areas examined.     Impression/Plan:  1. Dermatofibroma, left leg    Benign nature was discussed. No further intervention required at this time.     2. Multiple clinically benign nevi on the trunk and extremities    ABCDs of melanoma were discussed and self skin checks were advised.       CC Radha Renteria MD  [address]  [place] [address] on close of this encounter.  Follow-up prn for new or changing lesions.       Staff Involved:  ILiana MS4, saw and examined the patient in the presence of Dr. Yohannes KAM.    Staff Physician:  I was present with the medical student who participated in the service and in the documentation of the note. I have verified the history and personally performed the physical exam and medical decision making. I agree with the assessment and plan of care as documented in the note.     Nick Sheffield,  MD  Staff Dermatologist and Dermatopathologist  , Department of Dermatology

## 2019-11-29 NOTE — TELEPHONE ENCOUNTER
Letter written, cosigned, and faxed to number below.  Called pt phone number, left message on voicemail that letter faxed.Freida Alicia RN     Please come back to get your pneumonia shot (it's two shots total).     Medicare Wellness Visit  Plan for Preventive Care    A good way for you to stay healthy is to use preventive care.  Medicare covers many services that can help you stay healthy.* The goal of these services is to find any health problems as quickly as possible. Finding problems early can help make them easier to treat.  Your personal plan below lists the services you may need and when they are due.     Health Maintenance Summary     DTaP/Tdap/Td Vaccine (1 - Tdap)  Overdue since 12/4/1993    Shingles Vaccine (1 of 2)  Overdue since 2/12/1999    Pneumococcal Vaccine 65+ (1 of 2 - PCV13)  Overdue since 1/4/2012    Medicare Wellness 65+ (Yearly)  Overdue since 5/30/2019    Depression Screening (Yearly)  Next due on 11/29/2020    Breast Cancer Screening (Every 2 Years)  Next due on 10/9/2021    Colorectal Cancer Screening-Colonoscopy (Every 5 Years)  Next due on 11/30/2023    Hepatitis C Screening   Completed    Osteoporosis Screening   Completed    Influenza Vaccine   Completed    Meningococcal Vaccine   Aged Out           Preventive Care for Women and Men    Heart Screenings (Cardiovascular):  · Blood tests are used to check your cholesterol, lipid and triglyceride levels. High levels can increase your risk for heart disease and stroke. High levels can be treated with medications, diet and exercise. Lowering your levels can help keep your heart and blood vessels healthy.  Your provider will order these tests if they are needed.    · An ultrasound is done to see if you have an abdominal aortic aneurysm (AAA).  This is an enlargement of one of the main blood vessels that delivers blood to the body.   In the [place], 9,000 deaths are caused by AAA.  You may not even know you have this problem and as many as 1 in 3 people will have a serious problem if it is not treated.  Early diagnosis allows for more effective treatment and cure.  If you have  a family history of AAA or are a male age 65-75 who has smoked, you are at higher risk of an AAA.  Your provider can order this test, if needed.    Colorectal Screening:  · There are many tests that are used to check for cancer of your colon and rectum. You and your provider should discuss what test is best for you and when to have it done.  Options include:  · Screening Colonoscopy: exam of the entire colon, seen through a flexible lighted tube.  · Flexible Sigmoidoscopy: exam of the last third (sigmoid portion) of the colon and rectum, seen through a flexible lighted tube.  · Cologuard DNA stool test: a sample of your stool is used to screen for cancer and unseen blood in your stool.  · Fecal Occult Blood Test: a sample of your stool is studied to find any unseen blood    Flu Shot:  · An immunization that helps to prevent influenza (the flu). You should get this every year. The best time to get the shot is in the fall.    Pneumococcal Shot:  • Vaccines are available that can help prevent pneumococcal disease, which is any type of infection caused by Streptococcus pneumoniae bacteria.   Their use can prevent some cases of pneumonia, meningitis, and sepsis. There are two types of pneumococcal vaccines:   o Conjugate vaccines (PCV-13 or Prevnar 13®) - helps protect against the 13 types of pneumococcal bacteria that are the most common causes of serious infections in children and adults.    o Polysaccharide vaccine (PPSV23 or Kutryqejb00®) - helps protect against 23 types of pneumococcal bacteria for patients who are recommended to get it.  These vaccines should be given at least 12 months apart.  A booster is usually not needed.     Hepatitis B Shot:  · An immunization that helps to protect people from getting Hepatitis B. Hepatitis B is a virus that spreads through contact with infected blood or body fluids. Many people with the virus do not have symptoms.  The virus can lead to serious problems, such as liver  disease. Some people are at higher risk than others. Your doctor will tell you if you need this shot.     Diabetes Screening:  · A test to measure sugar (glucose) in your blood is called a fasting blood sugar. Fasting means you cannot have food or drink for at least 8 hours before the test. This test can detect diabetes long before you may notice symptoms.    Glaucoma Screening:  · Glaucoma screening is performed by your eye doctor. The test measures the fluid pressure inside your eyes to determine if you have glaucoma.     Hepatitis C Screening:  · A blood test to see if you have the hepatitis C virus.  Hepatitis C attacks the liver and is a major cause of chronic liver disease.  Medicare will cover a single screening for all adults born between 1945 & 1965, or high risk patients (people who have injected illegal drugs or people who have had blood transfusions).  High risk patients who continue to inject illegal drugs can be screened for Hepatitis C every year.    Smoking and Tobacco-Use Cessation Counseling:  · Tobacco is the single greatest cause of disease and early death in our country today. Medication and counseling together can increase a person’s chance of quitting for good.   · Medicare covers two quitting attempts per year, with four counseling sessions per attempt (eight sessions in a 12 month period)    Preventive Screening tests for Women    Screening Mammograms and Breast Exams:  · An x-ray of your breasts to check for breast cancer before you or your doctor may be able to feel it.  If breast cancer is found early it can usually be treated with success.    Pelvic Exams and Pap Tests:  · An exam to check for cervical and vaginal cancer. A Pap test is a lab test in which cells are taken from your cervix and sent to the lab to look for signs of cervical cancer. If cancer of the cervix is found early, chances for a cure are good. Testing can generally end at age 65, or if a woman has a hysterectomy for a  benign condition. Your provider may recommend more frequent testing if certain abnormal results are found.    Bone Mass Measurements:  · A painless x-ray of your bone density to see if you are at risk for a broken bone. Bone density refers to the thickness of bones or how tightly the bone tissue is packed.    Preventive Screening tests for Men    Prostate Screening:  · Should you have a prostate cancer test (PSA)?  It is up to you to decide if you want a prostate cancer test. Talk to your clinician to find out if the test is right for you.  Things for you to consider and talk about should include:  · Benefits and harms of the test  · Your family history  · How your race/ethnicity may influence the test  · If the test may impact other medical conditions you have  · Your values on screenings and treatments    *Medicare pays for many preventive services to keep you healthy. For some of these services, you might have to pay a deductible, coinsurance, and / or copayment.  The amounts vary depending on the type of services you need and the kind of Medicare health plan you have.

## 2020-02-20 ENCOUNTER — OFFICE VISIT (OUTPATIENT)
Dept: DERMATOLOGY | Facility: CLINIC | Age: 30
End: 2020-02-20
Payer: COMMERCIAL
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
## 2020-02-20 DIAGNOSIS — D48.9 NEOPLASM OF UNCERTAIN BEHAVIOR: ICD-10-CM

## 2020-02-20 DIAGNOSIS — D23.9 DERMATOFIBROMA: ICD-10-CM

## 2020-02-20 RX ORDER — TRIAMCINOLONE ACETONIDE 1 MG/G
OINTMENT TOPICAL 2 TIMES DAILY
Qty: 30 G | Refills: 0 | Status: SHIPPED | OUTPATIENT
Start: 2020-02-20 | End: 2020-12-08

## 2020-02-20 ASSESSMENT — PAIN SCALES - GENERAL: PAINLEVEL: NO PAIN (0)

## 2020-02-20 NOTE — LETTER
"2/20/2020       RE: Jono Gillis  [address]  [place] [address]     Dear Colleague,    Thank you for referring your patient, Jono Gillis, to the [facility] DERMATOLOGY at [facility]. Please see a copy of my visit note below.    [facility] Dermatology Note      Dermatology Problem List:  1. Bug bite L calf, currently using triamcinolone 0.1% ointment    #Lesions to monitor   - L forearm, photo 2/20/2020     Encounter Date: Feb 20, 2020    CC:  Chief Complaint   Patient presents with     Derm Problem     Patient is here today for a spot check on left arm and left lower leg          History of Present Illness:  Ms. Jono Gillis is a 30 year old female who presents today for concern of two lesions. She was last seen by Dr. Sheffield on 10/15/19 where she received a FBSE and no cancerous lesions were found. One of the lesions is on her L forearm. It has been present for about 4 months. She says that she noticed it two weeks after her most recent visit with Dr. Sheffield in October 2019. She says it was initially itchy. She also says that when she squezes it, it becomes larger. She does not report any pain or bleeding associated with the lesion. She also mentions a lesion on her L calf that appeared last week while staying at a hotel in [place]. She describes it as a \"bite\". She says she noticed it one morning with itching. There is bruising around the area. She has been using OTC benadryl cream. Patient is otherwise feeling well, no additional skin concerns.      Past Medical History:   Patient Active Problem List   Diagnosis     NO ACTIVE PROBLEMS     Past Medical History:   Diagnosis Date     Anxiety     homeopathic remedies, therapy     Past Surgical History:   Procedure Laterality Date     DENTAL SURGERY  2013    wisdom teeth       Social History:  Patient reports that she has never smoked. She has never used smokeless tobacco. She " reports current alcohol use. She reports that she does not use drugs.      Family History:  Family History   Problem Relation Age of Onset     Anxiety Disorder Mother      Depression Mother      Breast Cancer Maternal Grandmother         late 70's, not genetic     Breast Cancer Maternal Aunt         mid 50's     Anxiety Disorder Maternal Aunt      Depression Maternal Aunt      Melanoma No family hx of      Skin Cancer No family hx of        Medications:  Current Outpatient Medications   Medication Sig Dispense Refill     busPIRone (BUSPAR) 10 MG tablet Take 1 tablet (10 mg) by mouth 2 times daily (Patient not taking: Reported on 2/20/2020) 60 tablet 1     escitalopram (LEXAPRO) 10 MG tablet Take 1 tablet (10 mg) by mouth daily (Patient not taking: Reported on 10/15/2019) 90 tablet 3     Pseudoephedrine-Ibuprofen  MG TABS          No Known Allergies    Review of Systems:  -Skin Establ Pt: The patient denies any new rash, pruritus, or lesions that are symptomatic, changing or bleeding, except as per HPI.  -Constitutional: The patient is feeling generally well.    Physical exam:  GEN: This is a well developed, well-nourished female in no acute distress, in a pleasant mood.    SKIN: Focused examination of the L forearm arm and L leg was performed.  - [place] type: IV-V  - L inner forearm with ~2mm slightly pink fleshy papule. Dermoscopy with no distinct features.   - L lateral calf dark red to purplish patch with central punctum.  - There is a firm tan/flesh colored papule that dimples with lateral pressure on the L leg.  - No other lesions of concern on areas examined.          Impression/Plan:  1. Neoplasm of uncertain behavior on left forearm. Exam non-specific but overall reassuring. Possibly inflamed nevus versus dermatofibroma? Offered bx versus photomonitoring. She would like to monitor.   - Photos today. Patient to report changes.     2. Bug bite, L lateral calf. Natural history and etiology discussed.  Advised should self-resolve. In meantime, offered topical steroid for symptomatic management   - Start triamcinolone 0.1% ointment BID prn.    3. Dermatofibroma, left leg. Discussed the natural history and benign nature of this lesion. Reassurance provided that no additional treatment is necessary.     Follow-up as needed, earlier for new or changing lesions.       Staff Involved:    Scribe Disclosure  I, Catrachita Rooney, am serving as a scribe to document services personally performed by Dr. Monica Ayala MD, based on data collection and the provider's statements to me.     Provider Disclosure:   The documentation recorded by the scribe accurately reflects the services I personally performed and the decisions made by me.    Monica Ayala MD    Department of Dermatology  [facility]: Phone: [phone], Fax: [phone]  2/21/2020

## 2020-02-20 NOTE — PATIENT INSTRUCTIONS
I'm not sure what spot on arm was but it doesn't look worrisome today.  It might be an inflamed mole or a scar ????  If it grows, changes, or worries you, we can always check with a biopsy.    For leg, this does look like a bug bite, so weird!  To help go away faster, can use triamcinolone ointment twice daily until gone.  Let me know if get new ones or doesn't go away.    Return as needed.

## 2020-02-20 NOTE — NURSING NOTE
Chief Complaint   Patient presents with     Derm Problem     Patient is here today for a spot check on left arm and left lower leg      Katie Murphy CMA

## 2020-02-20 NOTE — PROGRESS NOTES
"[facility] Note      Dermatology Problem List:  1. Bug bite L calf, currently using triamcinolone 0.1% ointment    #Lesions to monitor   - L forearm, photo 2/20/2020     Encounter Date: Feb 20, 2020    CC:  Chief Complaint   Patient presents with     Derm Problem     Patient is here today for a spot check on left arm and left lower leg          History of Present Illness:  Ms. Jono Gillis is a 30 year old female who presents today for concern of two lesions. She was last seen by Dr. Sheffield on 10/15/19 where she received a FBSE and no cancerous lesions were found. One of the lesions is on her L forearm. It has been present for about 4 months. She says that she noticed it two weeks after her most recent visit with Dr. Sheffield in October 2019. She says it was initially itchy. She also says that when she squezes it, it becomes larger. She does not report any pain or bleeding associated with the lesion. She also mentions a lesion on her L calf that appeared last week while staying at a hotel in [place]. She describes it as a \"bite\". She says she noticed it one morning with itching. There is bruising around the area. She has been using OTC benadryl cream. Patient is otherwise feeling well, no additional skin concerns.      Past Medical History:   Patient Active Problem List   Diagnosis     NO ACTIVE PROBLEMS     Past Medical History:   Diagnosis Date     Anxiety     homeopathic remedies, therapy     Past Surgical History:   Procedure Laterality Date     DENTAL SURGERY  2013    wisdom teeth       Social History:  Patient reports that she has never smoked. She has never used smokeless tobacco. She reports current alcohol use. She reports that she does not use drugs.      Family History:  Family History   Problem Relation Age of Onset     Anxiety Disorder Mother      Depression Mother      Breast Cancer Maternal Grandmother         late 70's, not genetic     Breast Cancer Maternal Aunt "         mid 50's     Anxiety Disorder Maternal Aunt      Depression Maternal Aunt      Melanoma No family hx of      Skin Cancer No family hx of        Medications:  Current Outpatient Medications   Medication Sig Dispense Refill     busPIRone (BUSPAR) 10 MG tablet Take 1 tablet (10 mg) by mouth 2 times daily (Patient not taking: Reported on 2/20/2020) 60 tablet 1     escitalopram (LEXAPRO) 10 MG tablet Take 1 tablet (10 mg) by mouth daily (Patient not taking: Reported on 10/15/2019) 90 tablet 3     Pseudoephedrine-Ibuprofen  MG TABS          No Known Allergies    Review of Systems:  -Skin Establ Pt: The patient denies any new rash, pruritus, or lesions that are symptomatic, changing or bleeding, except as per HPI.  -Constitutional: The patient is feeling generally well.    Physical exam:  GEN: This is a well developed, well-nourished female in no acute distress, in a pleasant mood.    SKIN: Focused examination of the L forearm arm and L leg was performed.  - [place] type: IV-V  - L inner forearm with ~2mm slightly pink fleshy papule. Dermoscopy with no distinct features.   - L lateral calf dark red to purplish patch with central punctum.  - There is a firm tan/flesh colored papule that dimples with lateral pressure on the L leg.  - No other lesions of concern on areas examined.          Impression/Plan:  1. Neoplasm of uncertain behavior on left forearm. Exam non-specific but overall reassuring. Possibly inflamed nevus versus dermatofibroma? Offered bx versus photomonitoring. She would like to monitor.   - Photos today. Patient to report changes.     2. Bug bite, L lateral calf. Natural history and etiology discussed. Advised should self-resolve. In meantime, offered topical steroid for symptomatic management   - Start triamcinolone 0.1% ointment BID prn.    3. Dermatofibroma, left leg. Discussed the natural history and benign nature of this lesion. Reassurance provided that no additional treatment is  necessary.     Follow-up as needed, earlier for new or changing lesions.       Staff Involved:    Scribe Disclosure  I, Catrachita Rooney, am serving as a scribe to document services personally performed by Dr. Monica Ayala MD, based on data collection and the provider's statements to me.     Provider Disclosure:   The documentation recorded by the scribe accurately reflects the services I personally performed and the decisions made by me.    Monica Ayala MD    Department of Dermatology  [facility]: Phone: [phone], Fax: [phone]  2/21/2020

## 2020-03-10 ENCOUNTER — HEALTH MAINTENANCE LETTER (OUTPATIENT)
Age: 30
End: 2020-03-10

## 2020-03-10 ENCOUNTER — OFFICE VISIT (OUTPATIENT)
Dept: FAMILY MEDICINE | Facility: CLINIC | Age: 30
End: 2020-03-10
Payer: COMMERCIAL

## 2020-03-10 ENCOUNTER — HOSPITAL ENCOUNTER (OUTPATIENT)
Dept: GENERAL RADIOLOGY | Facility: CLINIC | Age: 30
Discharge: HOME | End: 2020-03-10
Attending: PHYSICIAN ASSISTANT | Admitting: PHYSICIAN ASSISTANT
Payer: COMMERCIAL

## 2020-03-10 VITALS
DIASTOLIC BLOOD PRESSURE: 82 MMHG | HEART RATE: 74 BPM | OXYGEN SATURATION: 99 % | WEIGHT: 187.9 LBS | TEMPERATURE: 97.6 F | BODY MASS INDEX: 28.57 KG/M2 | SYSTOLIC BLOOD PRESSURE: 114 MMHG

## 2020-03-10 DIAGNOSIS — B35.4 TINEA CORPORIS: ICD-10-CM

## 2020-03-10 DIAGNOSIS — M53.3 COCCYX PAIN: Primary | ICD-10-CM

## 2020-03-10 DIAGNOSIS — M53.3 COCCYX PAIN: ICD-10-CM

## 2020-03-10 PROCEDURE — 72220 X-RAY EXAM SACRUM TAILBONE: CPT

## 2020-03-10 PROCEDURE — 99213 OFFICE O/P EST LOW 20 MIN: CPT | Performed by: PHYSICIAN ASSISTANT

## 2020-03-10 NOTE — PATIENT INSTRUCTIONS
Lotrimin cream twice daily for up to 6 weeks  Over the counter hydrocortisone cream twice daily for up to 2 weeks  If not improved over the next 2 weeks, come back  Xray today  Follow up with ortho  Return to clinic for any new or worsening symptoms or go to ER Urgent care in off hours    Patient Education     Ringworm of the Skin    Ringworm is a fungal infection of the skin. Despite the name, a worm doesn't cause it. The cause of ringworm is a fungus that infects the outer layers of the skin. It is also not caused by bed bugs, scabies, or lice. These are totally different.  The medical term for ringworm is tinea. It can affect most parts of your body, although it seems to do better in moist areas of the body and around hair. It can be on almost any part of your body, including:    Arms, hands, legs, chest, feet, and back    Scalp    Beard    Groin    Between the toes  Depending on where it is located, sometimes the name changes:    Tinea capitis (scalp)    Tinea cruris (groin)    Tinea corporis (body)    Tinea pedis (feet)  Causes  Ringworm is very common all over the world, including the [place] It can take less than 1 week up to 2 weeks before you develop the infection after being exposed. So, you may not figure out the exact cause.  It is spread through direct contact with:    An infected person or animal    Infected soil, or objects such as towels, clothing, and ovalle  Symptoms  At first you might not notice ringworm. Or you may just see a small, red, often raised itchy spot or pimple. Sometimes there may only be one spot. At other times there may be several. Ringworm can look slightly different on different parts of the body, but there are some things are always present:    Irregular, round, oval or ring-shaped, which is why it's called ringworm    Clearer or lighter color at the center, since it spreads from the center of the spot outward    Red or inflamed look    Raised    Itchy    Scaly, dry, or flaky  Home  care  Follow these tips to help care for yourself at home:    Leave it alone. Don't scratch at the rash or pick it. This can increase the chance of infection and scarring.    Take medicine as prescribed. If you were prescribed a cream, apply it exactly as directed. Make sure to put the cream not just on the rash, but also on the skin 1 or 2 inches around it. Medicine by mouth is sometimes needed, particularly for ringworm on the scalp. Take it as directed and until your healthcare provider says to stop.    Keep it from spreading to others. Untreated ringworm of the skin is contagious by skin-to-skin contact. Your child may return to school 2 days after treatment has started.  Prevention  To some degree, prevention depends on what part of your body was affected. In general, the following good hygiene can help.    Clean up after you get dirty or sweaty, or after using a locker room.    When possible, don t share ovalle and brushes.    Avoid having your skin and feet wet or damp for long periods.    Wear clean, loose-fitting underwear.  Follow-up care  Follow up with your healthcare provider as advised by our staff if the rash does not improve after 10 days of treatment or if the rash spreads to other areas of the body.  When to seek medical advice  Call your healthcare provider right away if any of these occur:    Redness around the rash gets worse    Fluid drains from the rash    Fever of 100.4 F (38 C) or higher, or as directed by your healthcare provider  Date Last Reviewed: 8/1/2016 2000-2019 [employer]. [address] [place] [address], [place] [address]. All rights reserved. This information is not intended as a substitute for professional medical care. Always follow your healthcare professional's instructions.

## 2020-03-10 NOTE — PROGRESS NOTES
Subjective     Jono Gillis is a 30 year old female who presents to clinic today for the following health issues:    HPI   Acute Illness/ Bug bites   Acute illness concerns: Sick  Onset: Large bug bits across both legs. One a couple weeks ago then yesterday things really got worse. One is a little pus. Redness. Muscle stiffness.       Therapies Tried and outcome: Anti-itch cream, benedryl.     Reports noticing one lesion about 3 weeks ago. The last two popped up a few days ago  It is very itchy  The lesions started out as big as they are now. No pain          Joint Pain    Onset: 2018    Description:   Location: Tailbone.   Character: Dull ache    Intensity: mild, moderate, rarely will be severe. varies.     Progression of Symptoms: intermittent    Accompanying Signs & Symptoms:  Other symptoms: none    History:   Previous similar pain: no       Precipitating factors:   Trauma or overuse: YES- exercise class that she took that triggered it. Sitting long periods of times, on hard surfaced. From sitting to standing.     Alleviating factors:  Improved by: chiropractor and standing. IBU. Icing.     Therapies Tried and outcome: See above.       Has had this pain off and on for a few years  Sees a chiropractor who wanted her to get this checked out  It feels like a bruising  Worse going from sitting to standing        Problem list and histories reviewed & adjusted, as indicated.  Additional history: as documented    ROS:  C: NEGATIVE for fever, chills, change in weight  E/M: NEGATIVE for ear, mouth and throat problems  R: NEGATIVE for significant cough or SOB  CV: NEGATIVE for chest pain, palpitations or peripheral edema  NEURO: NEGATIVE for weakness, or persistent numbness or tingling  : NEGATIVE: dysuria, frequency, urgency, incontinence  GI: NEGATIVE for incontinence  MS: no swelling or redness in the lower extremities    Patient Active Problem List   Diagnosis     NO ACTIVE PROBLEMS     Past Surgical History:    Procedure Laterality Date     DENTAL SURGERY  2013    wisdom teeth       Social History     Tobacco Use     Smoking status: Never Smoker     Smokeless tobacco: Never Used   Substance Use Topics     Alcohol use: Yes     Comment: occ     Family History   Problem Relation Age of Onset     Anxiety Disorder Mother      Depression Mother      Breast Cancer Maternal Grandmother         late 70's, not genetic     Breast Cancer Maternal Aunt         mid 50's     Anxiety Disorder Maternal Aunt      Depression Maternal Aunt      Melanoma No family hx of      Skin Cancer No family hx of            Patient Active Problem List   Diagnosis     NO ACTIVE PROBLEMS     Past Surgical History:   Procedure Laterality Date     DENTAL SURGERY  2013    wisdom teeth       Social History     Tobacco Use     Smoking status: Never Smoker     Smokeless tobacco: Never Used   Substance Use Topics     Alcohol use: Yes     Comment: occ     Family History   Problem Relation Age of Onset     Anxiety Disorder Mother      Depression Mother      Breast Cancer Maternal Grandmother         late 70's, not genetic     Breast Cancer Maternal Aunt         mid 50's     Anxiety Disorder Maternal Aunt      Depression Maternal Aunt      Melanoma No family hx of      Skin Cancer No family hx of          Current Outpatient Medications   Medication Sig Dispense Refill     Pseudoephedrine-Ibuprofen  MG TABS        busPIRone (BUSPAR) 10 MG tablet Take 1 tablet (10 mg) by mouth 2 times daily (Patient not taking: Reported on 2/20/2020) 60 tablet 1     escitalopram (LEXAPRO) 10 MG tablet Take 1 tablet (10 mg) by mouth daily (Patient not taking: Reported on 10/15/2019) 90 tablet 3     triamcinolone 0.1 % EX external ointment Apply topically 2 times daily To bug bite as needed (Patient not taking: Reported on 3/10/2020) 30 g 0       OBJECTIVE:                                                    /82   Pulse 74   Temp 97.6  F (36.4  C) (Oral)   Wt 85.2 kg  (187 lb 14.4 oz)   LMP 02/25/2020   SpO2 99%   BMI 28.57 kg/m   Body mass index is 28.57 kg/m .   GENERAL: alert and oriented, in mild distress  EYES: PERRLA, no injection  HEENT: atraumatic, normocephalic. Nostrils patent, oropharynx hydrated without posterior exudates, edema or erythema.  HEART: Normal sinus rhythm, no murmurs rubs or gallops  LUNGS:CTAB  EXTREMITIES: Warm well perfused with good ROM and strength. Normal pulses; deep tendon reflexes are normal. negative straight leg raise on both. No edema or calf tenderness. 2+ pulses bilaterally.  BACK: symmetric, no curvature. No spinal or CVA tenderness. Full ROM and strength.  No tenderness over the coccyx  Neuro: Gait normal.  Reflexes normal and symmetric. Sensation grossly normal  SKIN: three separate lesions of similar appearance over right shin and left medial lower leg. 2 cm x 2 cm, macular with erythema, blanching and a scaly appearance with irregular borders.        ASSESSMENT/PLAN:                                                        ICD-10-CM    1. Coccyx pain  M53.3 Orthopedic & Spine  Referral     XR Sacrum and Coccyx 2 Views   2. Tinea corporis  B35.4            Patient Instructions   Lotrimin cream twice daily for up to 6 weeks  Over the counter hydrocortisone cream twice daily for up to 2 weeks  If not improved over the next 2 weeks, come back  Xray today  Follow up with ortho  Return to clinic for any new or worsening symptoms or go to ER Urgent care in off hours    Patient Education     Ringworm of the Skin    Ringworm is a fungal infection of the skin. Despite the name, a worm doesn't cause it. The cause of ringworm is a fungus that infects the outer layers of the skin. It is also not caused by bed bugs, scabies, or lice. These are totally different.  The medical term for ringworm is tinea. It can affect most parts of your body, although it seems to do better in moist areas of the body and around hair. It can be on almost any  part of your body, including:    Arms, hands, legs, chest, feet, and back    Scalp    Beard    Groin    Between the toes  Depending on where it is located, sometimes the name changes:    Tinea capitis (scalp)    Tinea cruris (groin)    Tinea corporis (body)    Tinea pedis (feet)  Causes  Ringworm is very common all over the world, including the [place] It can take less than 1 week up to 2 weeks before you develop the infection after being exposed. So, you may not figure out the exact cause.  It is spread through direct contact with:    An infected person or animal    Infected soil, or objects such as towels, clothing, and ovalle  Symptoms  At first you might not notice ringworm. Or you may just see a small, red, often raised itchy spot or pimple. Sometimes there may only be one spot. At other times there may be several. Ringworm can look slightly different on different parts of the body, but there are some things are always present:    Irregular, round, oval or ring-shaped, which is why it's called ringworm    Clearer or lighter color at the center, since it spreads from the center of the spot outward    Red or inflamed look    Raised    Itchy    Scaly, dry, or flaky  Home care  Follow these tips to help care for yourself at home:    Leave it alone. Don't scratch at the rash or pick it. This can increase the chance of infection and scarring.    Take medicine as prescribed. If you were prescribed a cream, apply it exactly as directed. Make sure to put the cream not just on the rash, but also on the skin 1 or 2 inches around it. Medicine by mouth is sometimes needed, particularly for ringworm on the scalp. Take it as directed and until your healthcare provider says to stop.    Keep it from spreading to others. Untreated ringworm of the skin is contagious by skin-to-skin contact. Your child may return to school 2 days after treatment has started.  Prevention  To some degree, prevention depends on what part of your body was  "affected. In general, the following good hygiene can help.    Clean up after you get dirty or sweaty, or after using a locker room.    When possible, don t share ovalle and brushes.    Avoid having your skin and feet wet or damp for long periods.    Wear clean, loose-fitting underwear.  Follow-up care  Follow up with your healthcare provider as advised by our staff if the rash does not improve after 10 days of treatment or if the rash spreads to other areas of the body.  When to seek medical advice  Call your healthcare provider right away if any of these occur:    Redness around the rash gets worse    Fluid drains from the rash    Fever of 100.4 F (38 C) or higher, or as directed by your healthcare provider  Date Last Reviewed: 8/1/2016 2000-2019 [employer]. [address] [place] [address], [place] [address]. All rights reserved. This information is not intended as a substitute for professional medical care. Always follow your healthcare professional's instructions.                 Estimated body mass index is 28.57 kg/m  as calculated from the following:    Height as of 9/3/19: 1.727 m (5' 8\").    Weight as of this encounter: 85.2 kg (187 lb 14.4 oz).   Weight management plan: See PCP    Nuvia Spencer  [facility]      "

## 2020-03-18 ENCOUNTER — MYC MEDICAL ADVICE (OUTPATIENT)
Dept: FAMILY MEDICINE | Facility: CLINIC | Age: 30
End: 2020-03-18

## 2020-03-19 ENCOUNTER — E-VISIT (OUTPATIENT)
Dept: FAMILY MEDICINE | Facility: CLINIC | Age: 30
End: 2020-03-19
Payer: COMMERCIAL

## 2020-03-19 DIAGNOSIS — B35.4 TINEA CORPORIS: Primary | ICD-10-CM

## 2020-03-19 PROCEDURE — 99421 OL DIG E/M SVC 5-10 MIN: CPT | Performed by: PHYSICIAN ASSISTANT

## 2020-03-20 RX ORDER — TRIAMCINOLONE ACETONIDE 1 MG/G
CREAM TOPICAL 2 TIMES DAILY
Qty: 30 G | Refills: 0 | Status: SHIPPED | OUTPATIENT
Start: 2020-03-20 | End: 2020-12-08

## 2020-03-20 RX ORDER — ITRACONAZOLE 100 MG/1
200 CAPSULE ORAL DAILY
Qty: 14 CAPSULE | Refills: 0 | Status: SHIPPED | OUTPATIENT
Start: 2020-03-20 | End: 2020-03-27

## 2020-10-12 ENCOUNTER — MYC MEDICAL ADVICE (OUTPATIENT)
Dept: OBGYN | Facility: CLINIC | Age: 30
End: 2020-10-12

## 2020-10-12 DIAGNOSIS — N92.6 IRREGULAR PERIODS: Primary | ICD-10-CM

## 2020-10-12 NOTE — TELEPHONE ENCOUNTER
She had a large cervical polyp that we removed in 2017 so should check an ultrasound for endometrial polyps.    Thanks  Radha Renteria MD

## 2020-10-12 NOTE — TELEPHONE ENCOUNTER
Patient advised. US order placed. Patient will be called to schedule, otherwise she can call the clinic to set up. Number for clinic provided. Sade Childress RN

## 2020-10-12 NOTE — TELEPHONE ENCOUNTER
Patient sent a [employer] message. She has an appointment scheduled with you in December for an AE. She has some concerns regarding her periods that she wanted to run by you. Her periods have started early both last month and this month. It lasts for a full 6-7 days, but this amount of time is typical for her. It's the frequencey that is off. For the month of September their were 19 days between the last day of her period in August and the first day of her period in September. Then this month, 18 days between her last period and the first day of this one. Not taking any medications. She would like to know your thoughts and if she should just continue to monitor. Thanks. Sade Childress RN

## 2020-10-19 ENCOUNTER — ANCILLARY PROCEDURE (OUTPATIENT)
Dept: ULTRASOUND IMAGING | Facility: CLINIC | Age: 30
End: 2020-10-19
Attending: OBSTETRICS & GYNECOLOGY
Payer: COMMERCIAL

## 2020-10-19 DIAGNOSIS — N92.6 IRREGULAR PERIODS: ICD-10-CM

## 2020-12-08 ENCOUNTER — OFFICE VISIT (OUTPATIENT)
Dept: OBGYN | Facility: CLINIC | Age: 30
End: 2020-12-08
Payer: COMMERCIAL

## 2020-12-08 VITALS
SYSTOLIC BLOOD PRESSURE: 125 MMHG | HEART RATE: 102 BPM | DIASTOLIC BLOOD PRESSURE: 84 MMHG | HEIGHT: 68 IN | WEIGHT: 189.8 LBS | TEMPERATURE: 97.2 F | BODY MASS INDEX: 28.76 KG/M2

## 2020-12-08 DIAGNOSIS — Z12.4 PAP SMEAR FOR CERVICAL CANCER SCREENING: ICD-10-CM

## 2020-12-08 DIAGNOSIS — Z01.419 ENCOUNTER FOR GYNECOLOGICAL EXAMINATION WITHOUT ABNORMAL FINDING: Primary | ICD-10-CM

## 2020-12-08 PROCEDURE — 87624 HPV HI-RISK TYP POOLED RSLT: CPT | Performed by: OBSTETRICS & GYNECOLOGY

## 2020-12-08 PROCEDURE — 99395 PREV VISIT EST AGE 18-39: CPT | Performed by: OBSTETRICS & GYNECOLOGY

## 2020-12-08 PROCEDURE — G0145 SCR C/V CYTO,THINLAYER,RESCR: HCPCS | Performed by: OBSTETRICS & GYNECOLOGY

## 2020-12-08 ASSESSMENT — MIFFLIN-ST. JEOR: SCORE: 1624.43

## 2020-12-08 NOTE — PROGRESS NOTES
Jono is a 30 year old  female who presents for annual exam.     Menses are regular q 24 days and normal, crampy and heavy lasting 6-7 days.  Menses flow: normal and heavy.  Patient's last menstrual period was 2020 (exact date).. Using condoms for contraception.  She is not currently considering pregnancy.  Besides routine health maintenance,  she would like to discuss considering pregnancy 1-1.5 year from now, and other concerns.  Wants to know if her cholesterol was okay the last time we checked it since 1 number was elevated.  Anxiety is much better now that she quit her job this summer and she is seeing a therapist every other week.  Planning pregnancy in about 1-1/2 years and wants to know if there is anything she needs to do to get ready for that?  Had a normal pelvic ultrasound in October with endometrium 8 mm and one follicular cyst noted.  GYNECOLOGIC HISTORY:  Menarche: 11  Age at first intercourse: 26 Number of lifetime partners: <6  Jono is sexually active with male partner(s) and is currently in monogamous relationship with .    History sexually transmitted infections:No STD history  STI testing offered?  Declined  MARTIN exposure: No  History of abnormal Pap smear: NO - age 30-65 PAP every 5 years with negative HPV co-testing recommended  Family history of breast CA: Yes (Please explain): mat gma, mat.aunt/ mat great aunt  Family history of uterine/ovarian CA: No    Family history of colon CA: No    HEALTH MAINTENANCE:  Cholesterol: (  Cholesterol   Date Value Ref Range Status   2019 196 <200 mg/dL Final   2017 200 (H) <200 mg/dL Final     Comment:     Desirable:       <200 mg/dl    History of abnormal lipids: Yes and  slight abnormal in 2017  Mammo: NA . History of abnormal Mammo: Not applicable.  Regular Self Breast Exams: Yes  Calcium/Vitamin D intake: source:  dietary supplement(s) Adequate? maybe  TSH: (No results found for: TSH )  Pap; (  Lab Results    Component Value Date    PAP NIL 2017    )    HISTORY:  OB History    Para Term  AB Living   0 0 0 0 0 0   SAB TAB Ectopic Multiple Live Births   0 0 0 0 0     Past Medical History:   Diagnosis Date     Anxiety     homeopathic remedies, therapy     Past Surgical History:   Procedure Laterality Date     DENTAL SURGERY  2013    wisdom teeth     Family History   Problem Relation Age of Onset     Anxiety Disorder Mother      Depression Mother      Breast Cancer Maternal Grandmother         late 70's, not genetic     Breast Cancer Maternal Aunt         mid 50's     Anxiety Disorder Maternal Aunt      Depression Maternal Aunt      Melanoma No family hx of      Skin Cancer No family hx of      Social History     Socioeconomic History     Marital status:      Spouse name: Not on file     Number of children: 0     Years of education: Not on file     Highest education level: Not on file   Occupational History     Occupation: director of student programs     Comment: [employer]   Social Needs     Financial resource strain: Not on file     Food insecurity     Worry: Not on file     Inability: Not on file     Transportation needs     Medical: Not on file     Non-medical: Not on file   Tobacco Use     Smoking status: Never Smoker     Smokeless tobacco: Never Used   Substance and Sexual Activity     Alcohol use: Yes     Comment: occ     Drug use: No     Sexual activity: Yes     Partners: Male     Birth control/protection: Condom   Lifestyle     Physical activity     Days per week: Not on file     Minutes per session: Not on file     Stress: Not on file   Relationships     Social connections     Talks on phone: Not on file     Gets together: Not on file     Attends Religion service: Not on file     Active member of club or organization: Not on file     Attends meetings of clubs or organizations: Not on file     Relationship status: Not on file     Intimate partner violence     Fear of  "current or ex partner: Not on file     Emotionally abused: Not on file     Physically abused: Not on file     Forced sexual activity: Not on file   Other Topics Concern     Parent/sibling w/ CABG, MI or angioplasty before 65F 55M? Not Asked   Social History Narrative     Not on file       Current Outpatient Medications:      VITAMIN D PO, , Disp: , Rfl:    No Known Allergies    Past medical, surgical, social and family history were reviewed and updated in [facility].    EXAM:  /84   Pulse 102   Temp 97.2  F (36.2  C) (Oral)   Ht 1.727 m (5' 8\")   Wt 86.1 kg (189 lb 12.8 oz)   LMP 11/26/2020 (Exact Date)   BMI 28.86 kg/m     BMI: Body mass index is 28.86 kg/m .  Constitutional: healthy, alert and no distress  Head: Normocephalic. No masses, lesions, tenderness or abnormalities  Neck: Neck supple. Trachea midline. No adenopathy. Thyroid symmetric, normal size.   Cardiovascular: RRR.   Respiratory: Negative.   Breast: Breasts reveal mild symmetric fibrocystic densities, but there are no dominant, discrete, fixed or suspicious masses found.  Gastrointestinal: Abdomen soft, non-tender, non-distended. No masses, organomegaly.  :  Vulva:  No external lesions, normal female hair distribution, no inguinal adenopathy.    Urethra:  Midline, non-tender, well supported, no discharge  Vagina:  Moist, pink, no abnormal discharge, no lesions  Uterus:  Normal size , non-tender, freely mobile  Ovaries:  No masses appreciated, non-tender, mobile  Rectal Exam: deferred  Musculoskeletal: extremities normal  Skin: no suspicious lesions or rashes  Psychiatric: Affect appropriate, cooperative,mentation appears normal.     COUNSELING:   Reviewed preventive health counseling, as reflected in patient instructions       Family planning       Folic Acid   reports that she has never smoked. She has never used smokeless tobacco.    Body mass index is 28.86 kg/m .  Weight management plan: at a normal weight  FRAX Risk " Assessment    ASSESSMENT:  30 year old female with satisfactory annual exam  (Z01.419) Encounter for gynecological examination without abnormal finding  (primary encounter diagnosis)  Comment: Condoms  Plan: given cycle instructions and can do OPK to see if she is actually ovulating.  Discussed need for 14-day luteal phase and that she is either ovulating early or may have a shorter luteal phase.  Either way, her cycles and ultrasound are demonstrated of probable normal ovulation and discussed taking something with folic acid 3 months prior to trying to conceive.  Questions were answered.    (Z12.4) Pap smear for cervical cancer screening  Plan: Pap imaged thin layer screen with HPV -         recommended age 30 - 65 years (select HPV order        below), HPV High Risk Types DNA Cervical        Discussed sending pap smear for HPV typing as well and that if both pap and HPV are negative, then can have q5 year pap smears.    Radha Renteria MD

## 2020-12-08 NOTE — PATIENT INSTRUCTIONS
Cycle instructions:    The first day of bleeding/period is called Day 1.    Start testing for ovulation using the store bought ovulation predictor kits on Day 8 of cycle.  When you get a positive surge, you will most likely be ovulating within the next 24 hours.       Test the urine about the same time each day.  (should try to test between 2-4 pm, empty bladder about noon)         The test is positive when you see 2 dark solid lines or smiley face.  (one faint line and one dark line is NOT positive)       Once the test is positive, you can stop testing.  Have sex/intercourse the day the test is positive.  The next day, have sex again.    If you don't have a period by 15-16 days after LH kit is positive (surge) then do urine pregnancy test and call clinic if positive.      Basic info:  The ovulation predictor kits are found in the condom/tampon section of the store.  Clear blue easy brand is simple to read.  Most women will get a positive LH surge before day 20 of the cycle.    Do not use lubercants with intercourse when trying to get pregnant. (pre-seed)

## 2020-12-08 NOTE — NURSING NOTE
"Chief Complaint   Patient presents with     Physical     annual and pap       Initial /84   Pulse 102   Temp 97.2  F (36.2  C) (Oral)   Wt 86.1 kg (189 lb 12.8 oz)   LMP 2020 (Exact Date)   BMI 28.86 kg/m   Estimated body mass index is 28.86 kg/m  as calculated from the following:    Height as of 9/3/19: 1.727 m (5' 8\").    Weight as of this encounter: 86.1 kg (189 lb 12.8 oz).  BP completed using cuff size: regular    Questioned patient about current smoking habits.  Pt. has never smoked.          The following HM Due: pap smear      The following patient reported/Care Every where data was sent to:  P ABSTRACT QUALITY INITIATIVES [[id number]]        patient has appointment for today  Karin Bojorquez                "

## 2020-12-11 LAB
COPATH REPORT: NORMAL
PAP: NORMAL

## 2020-12-15 LAB
FINAL DIAGNOSIS: NORMAL
HPV HR 12 DNA CVX QL NAA+PROBE: NEGATIVE
HPV16 DNA SPEC QL NAA+PROBE: NEGATIVE
HPV18 DNA SPEC QL NAA+PROBE: NEGATIVE
SPECIMEN DESCRIPTION: NORMAL
SPECIMEN SOURCE CVX/VAG CYTO: NORMAL

## 2021-10-09 ENCOUNTER — HEALTH MAINTENANCE LETTER (OUTPATIENT)
Age: 31
End: 2021-10-09

## 2021-10-16 ENCOUNTER — OFFICE VISIT (OUTPATIENT)
Dept: URGENT CARE | Facility: URGENT CARE | Age: 31
End: 2021-10-16
Payer: COMMERCIAL

## 2021-10-16 VITALS
OXYGEN SATURATION: 100 % | DIASTOLIC BLOOD PRESSURE: 86 MMHG | TEMPERATURE: 97.5 F | HEART RATE: 91 BPM | WEIGHT: 189 LBS | BODY MASS INDEX: 28.74 KG/M2 | SYSTOLIC BLOOD PRESSURE: 130 MMHG

## 2021-10-16 DIAGNOSIS — L98.9 SKIN LESION OF SCALP: Primary | ICD-10-CM

## 2021-10-16 DIAGNOSIS — R07.0 THROAT PAIN: ICD-10-CM

## 2021-10-16 DIAGNOSIS — R59.0 CERVICAL LYMPHADENOPATHY: ICD-10-CM

## 2021-10-16 LAB
DEPRECATED S PYO AG THROAT QL EIA: NEGATIVE
GROUP A STREP BY PCR: NOT DETECTED

## 2021-10-16 PROCEDURE — U0003 INFECTIOUS AGENT DETECTION BY NUCLEIC ACID (DNA OR RNA); SEVERE ACUTE RESPIRATORY SYNDROME CORONAVIRUS 2 (SARS-COV-2) (CORONAVIRUS DISEASE [COVID-19]), AMPLIFIED PROBE TECHNIQUE, MAKING USE OF HIGH THROUGHPUT TECHNOLOGIES AS DESCRIBED BY CMS-2020-01-R: HCPCS | Performed by: PHYSICIAN ASSISTANT

## 2021-10-16 PROCEDURE — U0005 INFEC AGEN DETEC AMPLI PROBE: HCPCS | Performed by: PHYSICIAN ASSISTANT

## 2021-10-16 PROCEDURE — 99214 OFFICE O/P EST MOD 30 MIN: CPT | Performed by: PHYSICIAN ASSISTANT

## 2021-10-16 PROCEDURE — 87651 STREP A DNA AMP PROBE: CPT | Performed by: PHYSICIAN ASSISTANT

## 2021-10-16 RX ORDER — CEPHALEXIN 500 MG/1
500 CAPSULE ORAL 3 TIMES DAILY
Qty: 30 CAPSULE | Refills: 0 | Status: SHIPPED | OUTPATIENT
Start: 2021-10-16 | End: 2021-10-26

## 2021-10-16 NOTE — PATIENT INSTRUCTIONS
(L98.9) Skin lesion of scalp  (primary encounter diagnosis)  Comment:   Plan: cephALEXin (KEFLEX) 500 MG capsule        Warm compress to area - rinse scalp after working out.      (R59.0) Cervical lymphadenopathy  Comment: secondary to scalp infection (no worries!!)  Plan: Streptococcus A Rapid Scr w Reflx to PCR - Lab         Collect, Symptomatic COVID-19 Virus         (Coronavirus) by PCR Nose          Ibuprofen as needed    (R07.0) Throat pain  Comment: recent URI  Plan: Streptococcus A Rapid Screen w/Reflex to PCR -         Clinic Collect, Group A Streptococcus PCR         Throat Swab        Follow covid isolation guidelines just as a precaution

## 2021-10-16 NOTE — PROGRESS NOTES
"Patient presents with:  Urgent Care  Throat Problem: c/o swollen glands for 3 days    Immunization History   Administered Date(s) Administered     TDAP Vaccine (Adacel) 08/07/2018     (L98.9) Skin lesion of scalp  (primary encounter diagnosis)  Comment:   Plan: cephALEXin (KEFLEX) 500 MG capsule        Hotpacks to the area.      Follow up with PCP should symptoms persist or worsen.      (R59.0) Cervical lymphadenopathy  Comment:   Plan: Streptococcus A Rapid Scr w Reflx to PCR - Lab         Collect, Symptomatic COVID-19 Virus         (Coronavirus) by PCR Nose            (R07.0) Throat pain  Comment:   Plan: Streptococcus A Rapid Screen w/Reflex to PCR -         Clinic Collect, Group A Streptococcus PCR         Throat Swab          Follow covid isolation guidelines as a precaution      SUBJECTIVE:   Jono Gillis is a 31 year old female who presents today with tender lumps at scalp line.     Coincidentally had URI symptoms last week, those have improved.     Did notice a \"pimple\" like lesion on left scalp recently.   Has been working out more and sweating.      Denies any fevers.  No other symptoms currently.   Has been hotpacking.      Immunization History   Administered Date(s) Administered     COVID-19,PF,Pfizer 04/15/2021, 05/06/2021     TDAP Vaccine (Adacel) 08/07/2018       Past Medical History:   Diagnosis Date     Anxiety     homeopathic remedies, therapy         Current Outpatient Medications   Medication Sig Dispense Refill     Multiple Vitamins-Iron (DAILY-JOLANTA/IRON/BETA-CAROTENE) TABS TAKE 1 TABLET BY MOUTH DAILY. (Patient not taking: Reported on 10/19/2020) 30 tablet 7     Social History     Tobacco Use     Smoking status: Never Smoker     Smokeless tobacco: Never Used   Substance Use Topics     Alcohol use: Not on file     Family History   Problem Relation Age of Onset     Diabetes Mother      Diabetes Father          ROS:    10 point ROS of systems including Constitutional, Eyes, Respiratory, " Cardiovascular, Gastroenterology, Genitourinary, Integumentary, Muscularskeletal, Psychiatric ,neurological were all negative except for pertinent positives noted in my HPI       OBJECTIVE:  /86   Pulse 91   Temp 97.5  F (36.4  C) (Tympanic)   Wt 85.7 kg (189 lb)   LMP 10/02/2021   SpO2 100%   BMI 28.74 kg/m    Physical Exam:  GENERAL APPEARANCE: healthy, alert and no distress  EYES: EOMI,  PERRL, conjunctiva clear  HENT: ear canals and TM's normal.  Nose and mouth without ulcers, erythema or lesions  NECK: supple, with post auricular lymphadenopathy as well as posterior cervical lymphadenopathy.  All nodes are less than 6-7mm in diameter and mobile and tender  SKIN: erythematous pustule on left parietal scalp

## 2021-10-17 LAB — SARS-COV-2 RNA RESP QL NAA+PROBE: NEGATIVE

## 2021-10-24 ASSESSMENT — ENCOUNTER SYMPTOMS
EYE PAIN: 0
HEMATURIA: 0
SORE THROAT: 0
ARTHRALGIAS: 0
FEVER: 0
FREQUENCY: 0
JOINT SWELLING: 0
PARESTHESIAS: 1
NAUSEA: 0
CHILLS: 0
HEMATOCHEZIA: 0
DYSURIA: 0
NERVOUS/ANXIOUS: 1
ABDOMINAL PAIN: 0
BREAST MASS: 0
WEAKNESS: 0
DIARRHEA: 0
DIZZINESS: 0
PALPITATIONS: 0
HEADACHES: 0
COUGH: 0
MYALGIAS: 0
CONSTIPATION: 0
HEARTBURN: 0

## 2021-10-26 ENCOUNTER — OFFICE VISIT (OUTPATIENT)
Dept: FAMILY MEDICINE | Facility: CLINIC | Age: 31
End: 2021-10-26
Payer: COMMERCIAL

## 2021-10-26 VITALS
DIASTOLIC BLOOD PRESSURE: 74 MMHG | OXYGEN SATURATION: 100 % | TEMPERATURE: 98.1 F | RESPIRATION RATE: 18 BRPM | BODY MASS INDEX: 28.95 KG/M2 | SYSTOLIC BLOOD PRESSURE: 124 MMHG | WEIGHT: 191 LBS | HEIGHT: 68 IN | HEART RATE: 80 BPM

## 2021-10-26 DIAGNOSIS — R59.1 LYMPHADENOPATHY: ICD-10-CM

## 2021-10-26 DIAGNOSIS — R19.5 LOOSE STOOLS: ICD-10-CM

## 2021-10-26 DIAGNOSIS — N89.8 VAGINAL ITCHING: ICD-10-CM

## 2021-10-26 DIAGNOSIS — Z11.4 SCREENING FOR HIV (HUMAN IMMUNODEFICIENCY VIRUS): ICD-10-CM

## 2021-10-26 DIAGNOSIS — Z11.59 NEED FOR HEPATITIS C SCREENING TEST: ICD-10-CM

## 2021-10-26 DIAGNOSIS — Z00.00 ROUTINE GENERAL MEDICAL EXAMINATION AT A HEALTH CARE FACILITY: Primary | ICD-10-CM

## 2021-10-26 DIAGNOSIS — J30.2 SEASONAL ALLERGIC RHINITIS, UNSPECIFIED TRIGGER: ICD-10-CM

## 2021-10-26 DIAGNOSIS — Z13.220 LIPID SCREENING: ICD-10-CM

## 2021-10-26 DIAGNOSIS — F41.9 ANXIETY: ICD-10-CM

## 2021-10-26 DIAGNOSIS — Z13.29 SCREENING FOR THYROID DISORDER: ICD-10-CM

## 2021-10-26 DIAGNOSIS — B37.31 YEAST INFECTION OF THE VAGINA: ICD-10-CM

## 2021-10-26 DIAGNOSIS — L29.9 ITCH OF SKIN: ICD-10-CM

## 2021-10-26 LAB
BASOPHILS # BLD AUTO: 0 10E3/UL (ref 0–0.2)
BASOPHILS NFR BLD AUTO: 0 %
CLUE CELLS: ABNORMAL
EOSINOPHIL # BLD AUTO: 0.1 10E3/UL (ref 0–0.7)
EOSINOPHIL NFR BLD AUTO: 2 %
ERYTHROCYTE [DISTWIDTH] IN BLOOD BY AUTOMATED COUNT: 13 % (ref 10–15)
HCT VFR BLD AUTO: 41.6 % (ref 35–47)
HGB BLD-MCNC: 13.8 G/DL (ref 11.7–15.7)
LYMPHOCYTES # BLD AUTO: 1.9 10E3/UL (ref 0.8–5.3)
LYMPHOCYTES NFR BLD AUTO: 26 %
MCH RBC QN AUTO: 28.5 PG (ref 26.5–33)
MCHC RBC AUTO-ENTMCNC: 33.2 G/DL (ref 31.5–36.5)
MCV RBC AUTO: 86 FL (ref 78–100)
MONOCYTES # BLD AUTO: 0.5 10E3/UL (ref 0–1.3)
MONOCYTES NFR BLD AUTO: 7 %
NEUTROPHILS # BLD AUTO: 4.7 10E3/UL (ref 1.6–8.3)
NEUTROPHILS NFR BLD AUTO: 65 %
PLATELET # BLD AUTO: 281 10E3/UL (ref 150–450)
RBC # BLD AUTO: 4.84 10E6/UL (ref 3.8–5.2)
TRICHOMONAS, WET PREP: ABNORMAL
WBC # BLD AUTO: 7.2 10E3/UL (ref 4–11)
WBC'S/HIGH POWER FIELD, WET PREP: ABNORMAL
YEAST, WET PREP: PRESENT

## 2021-10-26 PROCEDURE — 36415 COLL VENOUS BLD VENIPUNCTURE: CPT | Performed by: FAMILY MEDICINE

## 2021-10-26 PROCEDURE — 82784 ASSAY IGA/IGD/IGG/IGM EACH: CPT | Performed by: FAMILY MEDICINE

## 2021-10-26 PROCEDURE — 87389 HIV-1 AG W/HIV-1&-2 AB AG IA: CPT | Performed by: FAMILY MEDICINE

## 2021-10-26 PROCEDURE — 83516 IMMUNOASSAY NONANTIBODY: CPT | Performed by: FAMILY MEDICINE

## 2021-10-26 PROCEDURE — 86803 HEPATITIS C AB TEST: CPT | Performed by: FAMILY MEDICINE

## 2021-10-26 PROCEDURE — 87491 CHLMYD TRACH DNA AMP PROBE: CPT | Performed by: FAMILY MEDICINE

## 2021-10-26 PROCEDURE — 99213 OFFICE O/P EST LOW 20 MIN: CPT | Mod: 25 | Performed by: FAMILY MEDICINE

## 2021-10-26 PROCEDURE — 87210 SMEAR WET MOUNT SALINE/INK: CPT | Performed by: FAMILY MEDICINE

## 2021-10-26 PROCEDURE — 99385 PREV VISIT NEW AGE 18-39: CPT | Performed by: FAMILY MEDICINE

## 2021-10-26 PROCEDURE — 87591 N.GONORRHOEAE DNA AMP PROB: CPT | Performed by: FAMILY MEDICINE

## 2021-10-26 PROCEDURE — 80061 LIPID PANEL: CPT | Performed by: FAMILY MEDICINE

## 2021-10-26 PROCEDURE — 80050 GENERAL HEALTH PANEL: CPT | Performed by: FAMILY MEDICINE

## 2021-10-26 RX ORDER — FLUCONAZOLE 150 MG/1
150 TABLET ORAL ONCE
Qty: 1 TABLET | Refills: 0 | Status: SHIPPED | OUTPATIENT
Start: 2021-10-26 | End: 2021-10-26

## 2021-10-26 ASSESSMENT — ENCOUNTER SYMPTOMS
DIZZINESS: 0
HEARTBURN: 0
HEMATOCHEZIA: 0
NAUSEA: 0
FREQUENCY: 0
SORE THROAT: 0
NERVOUS/ANXIOUS: 1
ABDOMINAL PAIN: 0
JOINT SWELLING: 0
MYALGIAS: 0
HEADACHES: 0
DIARRHEA: 0
CONSTIPATION: 0
PARESTHESIAS: 1
COUGH: 0
ARTHRALGIAS: 0
CHILLS: 0
FEVER: 0
HEMATURIA: 0
WEAKNESS: 0
EYE PAIN: 0
PALPITATIONS: 0
BREAST MASS: 0
DYSURIA: 0

## 2021-10-26 ASSESSMENT — MIFFLIN-ST. JEOR: SCORE: 1624.87

## 2021-10-26 ASSESSMENT — PAIN SCALES - GENERAL: PAINLEVEL: NO PAIN (0)

## 2021-10-26 ASSESSMENT — PATIENT HEALTH QUESTIONNAIRE - PHQ9: SUM OF ALL RESPONSES TO PHQ QUESTIONS 1-9: 4

## 2021-10-26 NOTE — PROGRESS NOTES
SUBJECTIVE:   CC: Jono Gillis is an 31 year old woman who presents for preventive health visit.       Patient has been advised of split billing requirements and indicates understanding: Yes  Healthy Habits:     Getting at least 3 servings of Calcium per day:  NO    Bi-annual eye exam:  NO    Dental care twice a year:  Yes    Sleep apnea or symptoms of sleep apnea:  None    Diet:  Gluten-free/reduced and Breakfast skipped    Frequency of exercise:  4-5 days/week    Duration of exercise:  30-45 minutes    Taking medications regularly:  Not Applicable    Medication side effects:  Other    PHQ-2 Total Score: 2    Additional concerns today:  Yes    History of elevated lipids, recheck  Work out biking, no real diet changes  gluten sensitivity-stool changes, achy , itchy as well    Pap normal, no history of abnormal pap, last one 2020  No history of hpv shot here    No concerns for std  She thinks she has yeast , itching today  No UTI sx    Sexually active with one partner, condoms  She is taking vit D    No drug use, no smoking  Alcohol  Few times a week      ED/UC Followup:    Facility:  [facility] [place]   Date of visit: 10/16/21  Reason for visit:   Current Status: Skin lesion of scalp, lymphadenopathy, throat pain  Patient still taking Keflex.   May have a side effect to medication  1-2 days after taking she started feeling very itchy. Mostly on the neck. She had a couple rash like areas appear and on side of mouth. The rashs have gotten better.   Patient may have Yeast infection currently. May be a little bit of spotting.    She thinks she had a rash after taking antibiotics few days   She still continued, finished antibiotics  Itchy skin around neck  Strep and covid test neg in urgent care      Resolved per patient lymph nodes, gone down    Today's PHQ-2 Score:   PHQ-2 ( 1999 Pfizer) 10/24/2021   Q1: Little interest or pleasure in doing things 1   Q2: Feeling down, depressed  or hopeless 1   PHQ-2 Score 2   Q1: Little interest or pleasure in doing things Several days   Q2: Feeling down, depressed or hopeless Several days   PHQ-2 Score 2       Abuse: Current or Past (Physical, Sexual or Emotional) - No  Do you feel safe in your environment? Yes    Have you ever done Advance Care Planning? (For example, a Health Directive, POLST, or a discussion with a medical provider or your loved ones about your wishes): No, advance care planning information given to patient to review.  Patient plans to discuss their wishes with loved ones or provider.      Social History     Tobacco Use     Smoking status: Never Smoker     Smokeless tobacco: Never Used   Substance Use Topics     Alcohol use: Yes     Comment: occ         Alcohol Use 10/24/2021   Prescreen: >3 drinks/day or >7 drinks/week? No       Reviewed orders with patient.  Reviewed health maintenance and updated orders accordingly - Yes  BP Readings from Last 3 Encounters:   10/26/21 124/74   10/16/21 130/86   12/08/20 125/84    Wt Readings from Last 3 Encounters:   10/26/21 86.6 kg (191 lb)   10/16/21 85.7 kg (189 lb)   12/08/20 86.1 kg (189 lb 12.8 oz)                    Breast Cancer Screening:    FHS-7:   Breast CA Risk Assessment (FHS-7) 10/24/2021   Did any of your first-degree relatives have breast or ovarian cancer? No   Did any of your relatives have bilateral breast cancer? Unknown   Did any man in your family have breast cancer? No   Did any woman in your family have breast and ovarian cancer? Unknown   Did any woman in your family have breast cancer before age 50 y? No   Do you have 2 or more relatives with breast and/or ovarian cancer? Yes   Do you have 2 or more relatives with breast and/or bowel cancer? Yes     Breast cance 80;s grandmother  Aunt maternal in her 50's, 60    click delete button to remove this line now  Patient under 40 years of age: Routine Mammogram Screening not recommended.   Pertinent mammograms are reviewed under  the imaging tab.    History of abnormal Pap smear: NO - age 30- 65 PAP every 3 years recommended  PAP / HPV Latest Ref Rng & Units 2020   PAP (Historical) - NIL NIL   HPV16 NEG:Negative Negative -   HPV18 NEG:Negative Negative -   HRHPV NEG:Negative Negative -     Reviewed and updated as needed this visit by clinical staff  Tobacco  Allergies  Meds   Med Hx  Surg Hx  Fam Hx  Soc Hx        Reviewed and updated as needed this visit by Provider                Past Medical History:   Diagnosis Date     Anxiety     homeopathic remedies, therapy     Depressive disorder N/A    I have had this for many years      Past Surgical History:   Procedure Laterality Date     BIOPSY  Dont remember    It was benign.     DENTAL SURGERY  2013    wisdom teeth     OB History    Para Term  AB Living   0 0 0 0 0 0   SAB TAB Ectopic Multiple Live Births   0 0 0 0 0       Review of Systems   Constitutional: Negative for chills and fever.   HENT: Negative for congestion, ear pain, hearing loss and sore throat.    Eyes: Negative for pain and visual disturbance.   Respiratory: Negative for cough.    Cardiovascular: Negative for chest pain, palpitations and peripheral edema.   Gastrointestinal: Negative for abdominal pain, constipation, diarrhea, heartburn, hematochezia and nausea.   Breasts:  Negative for tenderness, breast mass and discharge.   Genitourinary: Positive for vaginal discharge. Negative for dysuria, frequency, genital sores, hematuria and urgency.   Musculoskeletal: Negative for arthralgias, joint swelling and myalgias.   Skin: Positive for rash.   Neurological: Positive for paresthesias. Negative for dizziness, weakness and headaches.   Psychiatric/Behavioral: Positive for mood changes. The patient is nervous/anxious.      CONSTITUTIONAL: NEGATIVE for fever, chills, change in weight  INTEGUMENTARU/SKIN: NEGATIVE for worrisome rashes, moles or lesions  EYES: NEGATIVE for vision changes or  "irritation  ENT: NEGATIVE for ear, mouth and throat problems  RESP: NEGATIVE for significant cough or SOB  BREAST: NEGATIVE for masses, tenderness or discharge  CV: NEGATIVE for chest pain, palpitations or peripheral edema  GI: NEGATIVE for nausea, abdominal pain, heartburn, or change in bowel habits  : NEGATIVE for unusual urinary or vaginal symptoms. Periods are regular.  MUSCULOSKELETAL: NEGATIVE for significant arthralgias or myalgia  NEURO: NEGATIVE for weakness, dizziness or paresthesias  PSYCHIATRIC: NEGATIVE for changes in mood or affect     OBJECTIVE:   /74   Pulse 80   Temp 98.1  F (36.7  C) (Oral)   Resp 18   Ht 1.727 m (5' 8\")   Wt 86.6 kg (191 lb)   LMP 10/04/2021 (Exact Date)   SpO2 100%   BMI 29.04 kg/m    Physical Exam  GENERAL: healthy, alert and no distress  EYES: Eyes grossly normal to inspection, PERRL and conjunctivae and sclerae normal  HENT: ear canals and TM's normal, nose and mouth without ulcers or lesions  NECK: no adenopathy, no asymmetry, masses, or scars and thyroid normal to palpation  RESP: lungs clear to auscultation - no rales, rhonchi or wheezes  BREAST: normal without masses, tenderness or nipple discharge and no palpable axillary masses or adenopathy  CV: regular rate and rhythm, normal S1 S2, no S3 or S4, no murmur, click or rub, no peripheral edema and peripheral pulses strong  ABDOMEN: soft, nontender, no hepatosplenomegaly, no masses and bowel sounds normal   (female): normal female external genitalia, white thick discharge, normal urethral meatus, vaginal mucosa pink, moist, well rugated, and normal cervix/adnexa/uterus without masses or discharge  MS: no gross musculoskeletal defects noted, no edema  SKIN: no suspicious lesions or rashes  NEURO: Normal strength and tone, mentation intact and speech normal  PSYCH: mentation appears normal, affect normal/bright    Diagnostic Test Results:  Labs reviewed in Epic  Results for orders placed or performed in " visit on 10/26/21   CBC with platelets and differential     Status: None   Result Value Ref Range    WBC Count 7.2 4.0 - 11.0 10e3/uL    RBC Count 4.84 3.80 - 5.20 10e6/uL    Hemoglobin 13.8 11.7 - 15.7 g/dL    Hematocrit 41.6 35.0 - 47.0 %    MCV 86 78 - 100 fL    MCH 28.5 26.5 - 33.0 pg    MCHC 33.2 31.5 - 36.5 g/dL    RDW 13.0 10.0 - 15.0 %    Platelet Count 281 150 - 450 10e3/uL    % Neutrophils 65 %    % Lymphocytes 26 %    % Monocytes 7 %    % Eosinophils 2 %    % Basophils 0 %    Absolute Neutrophils 4.7 1.6 - 8.3 10e3/uL    Absolute Lymphocytes 1.9 0.8 - 5.3 10e3/uL    Absolute Monocytes 0.5 0.0 - 1.3 10e3/uL    Absolute Eosinophils 0.1 0.0 - 0.7 10e3/uL    Absolute Basophils 0.0 0.0 - 0.2 10e3/uL   Wet prep - Clinic Collect     Status: Abnormal    Specimen: Vagina; Swab   Result Value Ref Range    Trichomonas Absent Absent    Yeast Present (A) Absent    Clue Cells Absent Absent    WBCs/high power field 3+ (A) None   CBC with platelets and differential     Status: None    Narrative    The following orders were created for panel order CBC with platelets and differential.  Procedure                               Abnormality         Status                     ---------                               -----------         ------                     CBC with platelets and d...[[id number]]                      Final result                 Please view results for these tests on the individual orders.       ASSESSMENT/PLAN:       ICD-10-CM    1. Routine general medical examination at a health care facility  Z00.00    2. Loose stools  R19.5 Tissue transglutaminase kami IgA and IgG     IgA     TSH with free T4 reflex     Tissue transglutaminase kami IgA and IgG     IgA     TSH with free T4 reflex   3. Yeast infection of the vagina  B37.3 fluconazole (DIFLUCAN) 150 MG tablet   4. Vaginal itching  N89.8 Chlamydia trachomatis PCR     Neisseria gonorrhoeae PCR     Neisseria gonorrhoeae PCR     Chlamydia trachomatis PCR   5.  Seasonal allergic rhinitis, unspecified trigger  J30.2    6. Lymphadenopathy  R59.1 Comprehensive metabolic panel (BMP + Alb, Alk Phos, ALT, AST, Total. Bili, TP)     CBC with platelets and differential     Comprehensive metabolic panel (BMP + Alb, Alk Phos, ALT, AST, Total. Bili, TP)     CBC with platelets and differential   7. Itch of skin  L29.9 Wet prep - Clinic Collect   8. BMI 29.0-29.9,adult  Z68.29 CBC with platelets and differential     TSH with free T4 reflex     CBC with platelets and differential     TSH with free T4 reflex   9. Screening for HIV (human immunodeficiency virus)  Z11.4 HIV Antigen Antibody Combo     HIV Antigen Antibody Combo   10. Need for hepatitis C screening test  Z11.59 Hepatitis C Screen Reflex to HCV RNA Quant and Genotype     Hepatitis C Screen Reflex to HCV RNA Quant and Genotype   11. Screening for thyroid disorder  Z13.29 TSH with free T4 reflex     TSH with free T4 reflex   12. Lipid screening  Z13.220 Lipid panel reflex to direct LDL Fasting     Lipid panel reflex to direct LDL Fasting   new patient to this provider    History of high cholesterol-recheck today    Seasonal allergies-stable on otc meds    Anxiety-she does see therapist as planned    History of recent skin infection with some lymphadenopathy-cbc stable, exam is normal now    Itching skin-some excoriated skin on neck, advised hydrocortisone otc if still itchy    Loose stools, abdominal  cramping, she thinks she is gluten sensitive, will get labs today for celic, close monitoring of diary of food and mood, recheck in 3 months    Yeast infection-Please use diflucan as discussed 1 tab if not better can repeat one more dose          Monitor mood and food as discussed for GI issues  Follow up in 3 months after getting a diary     Look at your shot records  For hep A/B  And HPV(3 doses)  Can go to pharmacy to update shots if need needed  Patient has been advised of split billing requirements and indicates understanding:  "Yes  COUNSELING:  Reviewed preventive health counseling, as reflected in patient instructions    Estimated body mass index is 29.04 kg/m  as calculated from the following:    Height as of this encounter: 1.727 m (5' 8\").    Weight as of this encounter: 86.6 kg (191 lb).    Weight management plan: Patient referred to endocrine and/or weight management specialty    She reports that she has never smoked. She has never used smokeless tobacco.      Counseling Resources:  ATP IV Guidelines  Pooled Cohorts Equation Calculator  Breast Cancer Risk Calculator  BRCA-Related Cancer Risk Assessment: FHS-7 Tool  FRAX Risk Assessment  ICSI Preventive Guidelines  Dietary Guidelines for Americans, 2010  USDA's MyPlate  ASA Prophylaxis  Lung CA Screening    DO BRAULIO Turner [facility]  "

## 2021-10-26 NOTE — PATIENT INSTRUCTIONS
Please use diflucan as discussed 1 tab if not better can repeat one more dose    Monitor mood and food as discussed for GI issues  Follow up in 3 months after getting a diary     Look at your shot records  For hep A/B  And HPV(3 doses)  Can go to pharmacy to update shots if need needed      Patient Education       Preventive Health Recommendations  Female Ages 26 - 39  Yearly exam:   See your health care provider every year in order to    Review health changes.     Discuss preventive care.      Review your medicines if you your doctor has prescribed any.    Until age 30: Get a Pap test every three years (more often if you have had an abnormal result).    After age 30: Talk to your doctor about whether you should have a Pap test every 3 years or have a Pap test with HPV screening every 5 years.   You do not need a Pap test if your uterus was removed (hysterectomy) and you have not had cancer.  You should be tested each year for STDs (sexually transmitted diseases), if you're at risk.   Talk to your provider about how often to have your cholesterol checked.  If you are at risk for diabetes, you should have a diabetes test (fasting glucose).  Shots: Get a flu shot each year. Get a tetanus shot every 10 years.   Nutrition:     Eat at least 5 servings of fruits and vegetables each day.    Eat whole-grain bread, whole-wheat pasta and brown rice instead of white grains and rice.    Get adequate Calcium and Vitamin D.     Lifestyle    Exercise at least 150 minutes a week (30 minutes a day, 5 days of the week). This will help you control your weight and prevent disease.    Limit alcohol to one drink per day.    No smoking.     Wear sunscreen to prevent skin cancer.    See your dentist every six months for an exam and cleaning.

## 2021-10-26 NOTE — LETTER
November 2, 2021      Jono Gillis  [address] [place] [address]        Dear ,    We are writing to inform you of your normal test results.      Resulted Orders   Wet prep - Clinic Collect   Result Value Ref Range    Trichomonas Absent Absent    Yeast Present (A) Absent    Clue Cells Absent Absent    WBCs/high power field 3+ (A) None   Neisseria gonorrhoeae PCR   Result Value Ref Range    Neisseria gonorrhoeae Negative Negative      Comment:      Negative for N. gonorrhoeae rRNA by transcription mediated amplification. A negative result by transcription mediated amplification does not preclude the presence of C. trachomatis infection because results are dependent on proper and adequate collection, absence of inhibitors and sufficient rRNA to be detected.   Chlamydia trachomatis PCR   Result Value Ref Range    Chlamydia trachomatis Negative Negative      Comment:      A negative result by transcription mediated amplification does not preclude the presence of C. trachomatis infection because results are dependent on proper and adequate collection, absence of inhibitors and sufficient rRNA to be detected.   HIV Antigen Antibody Combo   Result Value Ref Range    HIV Antigen Antibody Combo Nonreactive Nonreactive      Comment:      HIV-1 p24 Ag & HIV-1/HIV-2 Ab Not Detected   Hepatitis C Screen Reflex to HCV RNA Quant and Genotype   Result Value Ref Range    Hepatitis C Antibody Nonreactive Nonreactive    Narrative    Assay performance characteristics have not been established for newborns, infants, and children.   Tissue transglutaminase kami IgA and IgG   Result Value Ref Range    Tissue Transglutaminase Antibody IgA 0.6 <7.0 U/mL      Comment:      Negative- The tTG-IgA assay has limited utility for patients with decreased levels of IgA. Screening for celiac disease should include IgA testing to rule out selective IgA deficiency and to guide selection and interpretation of serological testing.  tTG-IgG testing may be positive in celiac disease patients with IgA deficiency.    Tissue Transglutaminase Antibody IgG <0.6 <7.0 U/mL      Comment:      Negative   IgA   Result Value Ref Range    Immunoglobulin A 283 84 - 499 mg/dL   Lipid panel reflex to direct LDL Fasting   Result Value Ref Range    Cholesterol 208 (H) <200 mg/dL    Triglycerides 52 <150 mg/dL    Direct Measure HDL 67 >=50 mg/dL    LDL Cholesterol Calculated 131 (H) <=100 mg/dL    Non HDL Cholesterol 141 (H) <130 mg/dL    Patient Fasting > 8hrs? Yes     Narrative    Cholesterol  Desirable:  <200 mg/dL    Triglycerides  Normal:  Less than 150 mg/dL  Borderline High:  150-199 mg/dL  High:  200-499 mg/dL  Very High:  Greater than or equal to 500 mg/dL    Direct Measure HDL  Female:  Greater than or equal to 50 mg/dL   Male:  Greater than or equal to 40 mg/dL    LDL Cholesterol  Desirable:  <100mg/dL  Above Desirable:  100-129 mg/dL   Borderline High:  130-159 mg/dL   High:  160-189 mg/dL   Very High:  >= 190 mg/dL    Non HDL Cholesterol  Desirable:  130 mg/dL  Above Desirable:  130-159 mg/dL  Borderline High:  160-189 mg/dL  High:  190-219 mg/dL  Very High:  Greater than or equal to 220 mg/dL   Comprehensive metabolic panel (BMP + Alb, Alk Phos, ALT, AST, Total. Bili, TP)   Result Value Ref Range    Sodium 135 133 - 144 mmol/L    Potassium 3.7 3.4 - 5.3 mmol/L    Chloride 105 94 - 109 mmol/L    Carbon Dioxide (CO2) 20 20 - 32 mmol/L    Anion Gap 10 3 - 14 mmol/L    Urea Nitrogen 9 7 - 30 mg/dL    Creatinine 0.81 0.52 - 1.04 mg/dL    Calcium 8.6 8.5 - 10.1 mg/dL    Glucose 85 70 - 99 mg/dL    Alkaline Phosphatase 62 40 - 150 U/L    AST 15 0 - 45 U/L    ALT 21 0 - 50 U/L    Protein Total 7.7 6.8 - 8.8 g/dL    Albumin 3.8 3.4 - 5.0 g/dL    Bilirubin Total 0.7 0.2 - 1.3 mg/dL    GFR Estimate >90 >60 mL/min/1.73m2      Comment:      As of July 11, 2021, eGFR is calculated by the CKD-EPI creatinine equation, without race adjustment. eGFR can be influenced  by muscle mass, exercise, and diet. The reported eGFR is an estimation only and is only applicable if the renal function is stable.   TSH with free T4 reflex   Result Value Ref Range    TSH 1.34 0.40 - 4.00 mU/L   CBC with platelets and differential   Result Value Ref Range    WBC Count 7.2 4.0 - 11.0 10e3/uL    RBC Count 4.84 3.80 - 5.20 10e6/uL    Hemoglobin 13.8 11.7 - 15.7 g/dL    Hematocrit 41.6 35.0 - 47.0 %    MCV 86 78 - 100 fL    MCH 28.5 26.5 - 33.0 pg    MCHC 33.2 31.5 - 36.5 g/dL    RDW 13.0 10.0 - 15.0 %    Platelet Count 281 150 - 450 10e3/uL    % Neutrophils 65 %    % Lymphocytes 26 %    % Monocytes 7 %    % Eosinophils 2 %    % Basophils 0 %    Absolute Neutrophils 4.7 1.6 - 8.3 10e3/uL    Absolute Lymphocytes 1.9 0.8 - 5.3 10e3/uL    Absolute Monocytes 0.5 0.0 - 1.3 10e3/uL    Absolute Eosinophils 0.1 0.0 - 0.7 10e3/uL    Absolute Basophils 0.0 0.0 - 0.2 10e3/uL       If you have any questions or concerns, please call the clinic at the number listed above.     Sincerely,    Kristi Herrera DO/shad

## 2021-10-27 LAB
ALBUMIN SERPL-MCNC: 3.8 G/DL (ref 3.4–5)
ALP SERPL-CCNC: 62 U/L (ref 40–150)
ALT SERPL W P-5'-P-CCNC: 21 U/L (ref 0–50)
ANION GAP SERPL CALCULATED.3IONS-SCNC: 10 MMOL/L (ref 3–14)
AST SERPL W P-5'-P-CCNC: 15 U/L (ref 0–45)
BILIRUB SERPL-MCNC: 0.7 MG/DL (ref 0.2–1.3)
BUN SERPL-MCNC: 9 MG/DL (ref 7–30)
C TRACH DNA SPEC QL NAA+PROBE: NEGATIVE
CALCIUM SERPL-MCNC: 8.6 MG/DL (ref 8.5–10.1)
CHLORIDE BLD-SCNC: 105 MMOL/L (ref 94–109)
CHOLEST SERPL-MCNC: 208 MG/DL
CO2 SERPL-SCNC: 20 MMOL/L (ref 20–32)
CREAT SERPL-MCNC: 0.81 MG/DL (ref 0.52–1.04)
FASTING STATUS PATIENT QL REPORTED: YES
GFR SERPL CREATININE-BSD FRML MDRD: >90 ML/MIN/1.73M2
GLUCOSE BLD-MCNC: 85 MG/DL (ref 70–99)
HCV AB SERPL QL IA: NONREACTIVE
HDLC SERPL-MCNC: 67 MG/DL
HIV 1+2 AB+HIV1 P24 AG SERPL QL IA: NONREACTIVE
IGA SERPL-MCNC: 283 MG/DL (ref 84–499)
LDLC SERPL CALC-MCNC: 131 MG/DL
N GONORRHOEA DNA SPEC QL NAA+PROBE: NEGATIVE
NONHDLC SERPL-MCNC: 141 MG/DL
POTASSIUM BLD-SCNC: 3.7 MMOL/L (ref 3.4–5.3)
PROT SERPL-MCNC: 7.7 G/DL (ref 6.8–8.8)
SODIUM SERPL-SCNC: 135 MMOL/L (ref 133–144)
TRIGL SERPL-MCNC: 52 MG/DL
TSH SERPL DL<=0.005 MIU/L-ACNC: 1.34 MU/L (ref 0.4–4)

## 2021-10-28 LAB
TTG IGA SER-ACNC: 0.6 U/ML
TTG IGG SER-ACNC: <0.6 U/ML

## 2022-05-25 NOTE — PROGRESS NOTES
"Jono is a 32 year old who is being evaluated via a billable telephone visit.      What phone number would you like to be contacted at? [phone]  How would you like to obtain your AVS? MyChart    Assessment & Plan     Infection due to 2019 novel coronavirus  Symptomatic therapy suggested: push fluids, rest, gargle warm salt water, use vaporizer or mist needed  and use acetaminophen, ibuprofen as needed.   - nirmatrelvir and ritonavir (PAXLOVID) therapy pack; 300 mg Nirmatrelvir (2X150 mg) and 100 mg Ritonavir twice daily for 5 days.    Cough  - nirmatrelvir and ritonavir (PAXLOVID) therapy pack; 300 mg Nirmatrelvir (2X150 mg) and 100 mg Ritonavir twice daily for 5 days.    Fever, unspecified fever cause  - nirmatrelvir and ritonavir (PAXLOVID) therapy pack; 300 mg Nirmatrelvir (2X150 mg) and 100 mg Ritonavir twice daily for 5 days.    BMI:   Estimated body mass index is 29.04 kg/m  as calculated from the following:    Height as of 10/26/21: 1.727 m (5' 8\").    Weight as of 10/26/21: 86.6 kg (191 lb).   Weight management plan: Discussed healthy diet and exercise guidelines        No follow-ups on file.    Jimbo Melendez MD  [facility] YANIQUE De La Cruz is a 32 year old who presents for the following health issues  accompanied by her self.    HPI       COVID-19 Symptom Review  How many days ago did these symptoms start? Started Monday evening 5/23    Are any of the following symptoms significant for you?    New or worsening difficulty breathing? No    Worsening cough? Yes, it's a dry cough.     Fever or chills? Yes, I felt feverish or had chills. fatigued     Headache: no    Sore throat: YES    Chest pain: no    Diarrhea: no    Body aches? YES    What treatments has patient tried? Drinking tea, emergen-C, advil cold and sinus   Does patient live in a nursing home, group home, or shelter? no  Does patient have a way to get food/medications during quarantined? Yes, I have a friend " or family member who can help me.      Review of Systems   Constitutional, HEENT, cardiovascular, pulmonary, GI, , musculoskeletal, neuro, skin, endocrine and psych systems are negative, except as otherwise noted.      Objective           Vitals:  No vitals were obtained today due to virtual visit.    Physical Exam   healthy, alert and no distress  PSYCH: Alert and oriented times 3; coherent speech, normal   rate and volume, able to articulate logical thoughts, able   to abstract reason, no tangential thoughts, no hallucinations   or delusions  Her affect is normal  RESP: No cough, no audible wheezing, able to talk in full sentences  Remainder of exam unable to be completed due to telephone visits            Phone call duration: 21 minutes

## 2022-05-26 ENCOUNTER — VIRTUAL VISIT (OUTPATIENT)
Dept: FAMILY MEDICINE | Facility: CLINIC | Age: 32
End: 2022-05-26
Payer: COMMERCIAL

## 2022-05-26 DIAGNOSIS — R05.9 COUGH: ICD-10-CM

## 2022-05-26 DIAGNOSIS — R50.9 FEVER, UNSPECIFIED FEVER CAUSE: ICD-10-CM

## 2022-05-26 DIAGNOSIS — U07.1 INFECTION DUE TO 2019 NOVEL CORONAVIRUS: Primary | ICD-10-CM

## 2022-05-26 PROCEDURE — 99213 OFFICE O/P EST LOW 20 MIN: CPT | Mod: TEL | Performed by: FAMILY MEDICINE

## 2022-09-17 ENCOUNTER — HEALTH MAINTENANCE LETTER (OUTPATIENT)
Age: 32
End: 2022-09-17

## 2022-12-28 NOTE — PATIENT INSTRUCTIONS
Patient Education     Viral Upper Respiratory Illness (Adult)  You have a viral upper respiratory illness (URI), which is another term for the common cold. This illness is contagious during the first few days. It is spread through the air by coughing and sneezing. It may also be spread by direct contact (touching the sick person and then touching your own eyes, nose, or mouth). Frequent handwashing will decrease risk of spread. Most viral illnesses go away within 7 to 10 days with rest and simple home remedies. Sometimes the illness may last for several weeks. Antibiotics will not kill a virus, and they are generally not prescribed for this condition.    Home care    If symptoms are severe, rest at home for the first 2 to 3 days. When you resume activity, don't let yourself get too tired.    Don't smoke. If you need help stopping, talk with your healthcare provider.    Avoid being exposed to cigarette smoke (yours or others ).    You may use acetaminophen or ibuprofen to control pain and fever, unless another medicine was prescribed. If you have chronic liver or kidney disease, have ever had a stomach ulcer or gastrointestinal bleeding, or are taking blood-thinning medicines, talk with your healthcare provider before using these medicines. Aspirin should never be given to anyone under 18 years of age who is ill with a viral infection or fever. It may cause severe liver or brain damage.    Your appetite may be poor, so a light diet is fine. Stay well hydrated by drinking 6 to 8 glasses of fluids per day (water, soft drinks, juices, tea, or soup). Extra fluids will help loosen secretions in the nose and lungs.    Over-the-counter cold medicines will not shorten the length of time you re sick, but they may be helpful for the following symptoms: cough, sore throat, and nasal and sinus congestion. If you take prescription medicines, ask your healthcare provider or pharmacist which over-the-counter medicines are  Elevated CRP and sed rate. Dr. Prieto recommends left hip aspiration prior to NICOLE to rule out infection.    Orders were placed as STAT. Will have patient schedule as soon as possible.    Left voicemail message for patient to return call to clinic to discuss.   safe to use. (Note: Don't use decongestants if you have high blood pressure.)  Follow-up care  Follow up with your healthcare provider, or as advised.  When to seek medical advice  Call your healthcare provider right away if any of these occur:    Cough with lots of colored sputum (mucus)    Severe headache; face, neck, or ear pain    Difficulty swallowing due to throat pain    Fever of 100.4 F (38 C) or higher, or as directed by your healthcare provider  Call 911  Call 911 if any of these occur:    Chest pain, shortness of breath, wheezing, or difficulty breathing    Coughing up blood    Very severe pain with swallowing, especially if it goes along with a muffled voice   Date Last Reviewed: 6/1/2018 2000-2018 [employer]. [address] [place] [address], [place] [address]. All rights reserved. This information is not intended as a substitute for professional medical care. Always follow your healthcare professional's instructions.

## 2023-01-23 ENCOUNTER — HEALTH MAINTENANCE LETTER (OUTPATIENT)
Age: 33
End: 2023-01-23

## 2024-02-24 ENCOUNTER — HEALTH MAINTENANCE LETTER (OUTPATIENT)
Age: 34
End: 2024-02-24

## 2024-07-15 ENCOUNTER — OFFICE VISIT (OUTPATIENT)
Dept: FAMILY MEDICINE | Facility: CLINIC | Age: 34
End: 2024-07-15
Payer: COMMERCIAL

## 2024-07-15 VITALS
HEART RATE: 84 BPM | RESPIRATION RATE: 18 BRPM | HEIGHT: 67 IN | BODY MASS INDEX: 31.13 KG/M2 | DIASTOLIC BLOOD PRESSURE: 82 MMHG | SYSTOLIC BLOOD PRESSURE: 121 MMHG | TEMPERATURE: 98.5 F | OXYGEN SATURATION: 99 % | WEIGHT: 198.38 LBS

## 2024-07-15 DIAGNOSIS — N20.1 RIGHT URETERAL CALCULUS: Primary | ICD-10-CM

## 2024-07-15 PROCEDURE — 99215 OFFICE O/P EST HI 40 MIN: CPT | Performed by: PHYSICIAN ASSISTANT

## 2024-07-15 RX ORDER — TAMSULOSIN HYDROCHLORIDE 0.4 MG/1
0.4 CAPSULE ORAL
COMMUNITY
Start: 2024-07-12 | End: 2024-07-15 | Stop reason: SINTOL

## 2024-07-15 RX ORDER — ONDANSETRON 4 MG/1
4 TABLET, ORALLY DISINTEGRATING ORAL
COMMUNITY
Start: 2024-07-12

## 2024-07-15 RX ORDER — TERAZOSIN 2 MG/1
2 CAPSULE ORAL AT BEDTIME
Qty: 30 CAPSULE | Refills: 0 | Status: SHIPPED | OUTPATIENT
Start: 2024-07-15

## 2024-07-15 RX ORDER — MORPHINE SULFATE 15 MG/1
15 TABLET ORAL
COMMUNITY
Start: 2024-07-12 | End: 2024-07-25

## 2024-07-15 ASSESSMENT — PAIN SCALES - GENERAL: PAINLEVEL: MODERATE PAIN (5)

## 2024-07-15 NOTE — PROGRESS NOTES
"  Assessment & Plan     Right ureteral calculus  I reviewed ER notes from last week showing 5 mm right kidney stone. No hydronephrosis noted, Patient is managing pain well. Has not taken morphine, Encouraged Advil over morphine for pain management. No red flags noted today. Will try Terazosin to see if this helps with kidney stone expulsion but side effects of dizziness discussed. Unclear if this will pass on its own. I recommend following up with Urology in the next 1-2 weeks if it has not passed. Warning signs to go back to ER educated to patient.    - Adult Urology  Referral; Future  - terazosin (HYTRIN) 2 MG capsule; Take 1 capsule (2 mg) by mouth at bedtime        MED REC REQUIRED  Post Medication Reconciliation Status: discharge medications reconciled and changed, per note/orders  BMI  Estimated body mass index is 30.88 kg/m  as calculated from the following:    Height as of this encounter: 1.707 m (5' 7.21\").    Weight as of this encounter: 90 kg (198 lb 6 oz).         I spent 40 minutes with patient spent counseling and coordinating patient's care as well as discussing patient's plan of care, documenting in EMR and reviewing PMH.      Leandro De La Cruz is a 34 year old, presenting for the following health issues:  ER F/U    HPI       ED/UC Followup:    Facility:  [facility]  Date of visit: 7/12/2024  Reason for visit: Right Ureteral Stone ( 5 mm )  Current Status: Patient reports feeling okay today. The pain is not nearly as bad as prior. She is still experiencing intermittent lower right back pain, taking Advil. Mild painful urination hematuria, drinking lots of fluids. She is was prescribed Flomax, but was experiencing mild dizziness so she stopped Rx. She never started the Morphine and had not need to take Ondansetron.              Review of Systems  Constitutional, HEENT, cardiovascular, pulmonary, GI, , musculoskeletal, neuro, skin, endocrine and psych systems are " "negative, except as otherwise noted.      Objective    /82   Pulse 84   Temp 98.5  F (36.9  C) (Oral)   Resp 18   Ht 1.707 m (5' 7.21\")   Wt 90 kg (198 lb 6 oz)   LMP 06/25/2024   SpO2 99%   BMI 30.88 kg/m    Body mass index is 30.88 kg/m .  Physical Exam   GENERAL: alert and no distress  NECK: no adenopathy, no asymmetry, masses, or scars  RESP: lungs clear to auscultation - no rales, rhonchi or wheezes  CV: regular rate and rhythm, normal S1 S2, no S3 or S4, no murmur, click or rub, no peripheral edema  ABDOMEN: soft, nontender, no hepatosplenomegaly, no masses and bowel sounds normal  MS: no gross musculoskeletal defects noted, no edema  BACK: no CVA tenderness, no paralumbar tenderness  PSYCH: mentation appears normal, affect normal/bright            Signed Electronically by: SINAI Powers    "

## 2024-07-15 NOTE — PATIENT INSTRUCTIONS
Monitor symptoms. If running fevers, nausea, vomiting, or pain in unmanageable then please go back to ER  Okay to take 3,400 mg of ibuprofen in 24 hrs  Continue with fluids   Can take terazosin at bedtime. May have dizziness as side effect  Make follow up appointment with Urology if stone does not pass in the next 1-2 weeks

## 2024-07-17 ENCOUNTER — PRE VISIT (OUTPATIENT)
Dept: UROLOGY | Facility: CLINIC | Age: 34
End: 2024-07-17
Payer: COMMERCIAL

## 2024-07-17 NOTE — TELEPHONE ENCOUNTER
Reason for visit: kidney stone     Relevant information: 5mm ureteral stone    Records/imaging/labs/orders: all records available    Pt called: No need for a call    At Rooming: standard procedure    Ester Hoover  7/17/2024  9:28 AM

## 2024-07-18 NOTE — TELEPHONE ENCOUNTER
Action 2024 Jtv 2:08 PM    Action Taken CSS sent an urgent request to [facility] for images.   HIM Department  Fax: [phone]  Phone: [phone]  [email or web address]    Trackin    Message sent to Provider and Nurse with update of pending images from [place].     MEDICAL RECORDS REQUEST   [facility]  Urology Clinic  [address] [place] [address]  PHONE: [phone]  Fax: [phone]        FUTURE VISIT INFORMATION                                                   Gabrielaemmettoscar Gillis, : 1990 scheduled for future visit at [facility]    APPOINTMENT INFORMATION:  Date: 2024  Provider:  Nata Palomares CNP  Reason for Visit/Diagnosis: 5mm ureteral stone    REFERRAL INFORMATION:  Referring provider:  Kary Ramsey PA in [facility]      RECORDS REQUESTED FOR VISIT                                                     NOTES  STATUS/DETAILS   OFFICE NOTE from referring provider  yes, 07/15/2024 -- Kary Ramsey PA in [facility]   OFFICE NOTE from other specialist  yes   DISCHARGE REPORT from the ER  YES, 2024 - [facility] ED   MEDICATION LIST  yes   LABS     URINALYSIS (UA)  yes   images pending Yes, [facility]   2024 -- CT ABD PELVIS     PRE-VISIT CHECKLIST      Joint diagnostic appointment coordinated correctly          (ensure right order & amount of time) Yes   RECORD COLLECTION COMPLETE If no, please explain images

## 2024-07-19 ENCOUNTER — VIRTUAL VISIT (OUTPATIENT)
Dept: UROLOGY | Facility: CLINIC | Age: 34
End: 2024-07-19
Payer: COMMERCIAL

## 2024-07-19 ENCOUNTER — MYC MEDICAL ADVICE (OUTPATIENT)
Dept: UROLOGY | Facility: CLINIC | Age: 34
End: 2024-07-19

## 2024-07-19 ENCOUNTER — PRE VISIT (OUTPATIENT)
Dept: UROLOGY | Facility: CLINIC | Age: 34
End: 2024-07-19

## 2024-07-19 DIAGNOSIS — N20.1 RIGHT URETERAL CALCULUS: ICD-10-CM

## 2024-07-19 PROCEDURE — 99203 OFFICE O/P NEW LOW 30 MIN: CPT | Mod: 95 | Performed by: NURSE PRACTITIONER

## 2024-07-19 RX ORDER — KETOROLAC TROMETHAMINE 10 MG/1
10 TABLET, FILM COATED ORAL EVERY 6 HOURS PRN
Qty: 20 TABLET | Refills: 0 | Status: SHIPPED | OUTPATIENT
Start: 2024-07-19 | End: 2024-07-23

## 2024-07-19 ASSESSMENT — PAIN SCALES - GENERAL: PAINLEVEL: MODERATE PAIN (4)

## 2024-07-19 NOTE — PATIENT INSTRUCTIONS
UROLOGY CLINIC VISIT PATIENT INSTRUCTIONS    -I have sent in some toradol to use as needed.   -Studies have also shown that over-the-counter Dramamine (dimenhydrinate) can help with flank pain episodes overnight when passing a stone. You can pick this up at any drug store. Make sure it is the drowsy version, and take before you go to bed at night.   -Continue to push fluids as tolerated.   -If you see your stone pass, capture it and submit it to the lab for analysis.   -Will otherwise plan to obtain a CT scan in one week.       If you have any issues, questions or concerns in the meantime, do not hesitate to contact us at [phone] or via [employer].     Nata Palomares, CNP  Department of Urology

## 2024-07-19 NOTE — PROGRESS NOTES
Virtual Visit Details    Type of service:  Video Visit   Originating Location (pt. Location): Home  Distant Location (provider location):  On-site  Platform used for Video Visit: Jackson

## 2024-07-19 NOTE — LETTER
7/19/2024       RE: Jono Gillis  [address] [facility] [address]     Dear Colleague,    Thank you for referring your patient, Jono Gillis, to the [facility] [place] at [facility]. Please see a copy of my visit note below.    Video start time: 3:31 PM  Video end time: 4:00 PM    CC: Kidney stones    Assessment/Plan:  34 year old female with a 5 mm proximal to mid right ureteral stone identified one week ago. Pain control has been difficult- primarily using ibuprofen. Has morphine but has not wanted to take it due to side effects. We discussed the potential benefit of bedtime dramamine to help with pain overnight and she will try this. She feels she needs stronger pain med options and we discussed trying oxycodone versus oral Toradol. She would like to try the Toradol. Will plan to monitor for not and re-image in one week. If no evidence of stone passage by then and her pain control remains suboptimal, will consider referral for ureteroscopy. We discussed this in brief today.   -Toradol 10 mg as needed for pain  -Continue to utilize ibuprofen and other OTC options, including dramamine at HS.   -Push fluids as tolerated.   -CT in one week to check for passage. She will continue to monitor for passage and if she sees her stone pass, will submit to the lab. Stone analysis order placed.     Nata Palomares, CNP  Department of Urology      Subjective:   34 year old female who presents for virtual consult regarding kidney stones. She was seen in an ED last week in [place] for right-sided flank pain and hematuria. CT obtained, showing a 5 mm stone in her proximal to mid right ureter. No other stones present.     Today, she states that she has been experiencing pain in her back that radiates to the front. She has been trying to rotate tylenol and ibuprofen for pain control. She gets little benefit from tylenol. Was discharged  from the ED with morphine but has not taken this. Was told about dramamine but has not yet tried this. Not taking Flomax as this caused dizziness.     This is her first stone. Has a family history in her mother, who has needed surgery for stones in the past.     Objective:     Exam:   Patient is a 34 year old female   No vital signs obtained due to virtual visit.  General Appearance: Well groomed, hygenic  Respiratory: No cough, no respiratory distress or labored breathing  Musculoskeletal: Grossly normal with no gross deficits  Skin: No discoloration or apparent rashes  Neurologic: No tremors  Psychiatric: Alert and oriented  Further examination is deferred due to the nature of our visit.             Virtual Visit Details    Type of service:  Video Visit   Originating Location (pt. Location): Home  Distant Location (provider location):  On-site  Platform used for Video Visit: Jackson

## 2024-07-19 NOTE — NURSING NOTE
Is the patient currently in the state of [place]? YES    Visit mode:VIDEO    If the visit is dropped, the patient can be reconnected by: VIDEO VISIT: Send to e-mail at: [email or web address]    Will anyone else be joining the visit? NO  (If patient encounters technical issues they should call [phone])    How would you like to obtain your AVS? MyChart    Are changes needed to the allergy or medication list? No    Are refills needed on medications prescribed by this physician? NO    Reason for visit: Consult      Pt reports pain is at a level of 7 in mornings, has been up at night with nocturia, and is experiencing issues with lack of sleep due to this.   Rohini BUITRAGOF

## 2024-07-19 NOTE — PROGRESS NOTES
Video start time: 3:31 PM  Video end time: 4:00 PM    CC: Kidney stones    Assessment/Plan:  34 year old female with a 5 mm proximal to mid right ureteral stone identified one week ago. Pain control has been difficult- primarily using ibuprofen. Has morphine but has not wanted to take it due to side effects. We discussed the potential benefit of bedtime dramamine to help with pain overnight and she will try this. She feels she needs stronger pain med options and we discussed trying oxycodone versus oral Toradol. She would like to try the Toradol. Will plan to monitor for not and re-image in one week. If no evidence of stone passage by then and her pain control remains suboptimal, will consider referral for ureteroscopy. We discussed this in brief today.   -Toradol 10 mg as needed for pain  -Continue to utilize ibuprofen and other OTC options, including dramamine at HS.   -Push fluids as tolerated.   -CT in one week to check for passage. She will continue to monitor for passage and if she sees her stone pass, will submit to the lab. Stone analysis order placed.     Nata Palomares, CNP  Department of Urology      Subjective:   34 year old female who presents for virtual consult regarding kidney stones. She was seen in an ED last week in [place] for right-sided flank pain and hematuria. CT obtained, showing a 5 mm stone in her proximal to mid right ureter. No other stones present.     Today, she states that she has been experiencing pain in her back that radiates to the front. She has been trying to rotate tylenol and ibuprofen for pain control. She gets little benefit from tylenol. Was discharged from the ED with morphine but has not taken this. Was told about dramamine but has not yet tried this. Not taking Flomax as this caused dizziness.     This is her first stone. Has a family history in her mother, who has needed surgery for stones in the past.     Objective:     Exam:   Patient is a 34 year old female   No  vital signs obtained due to virtual visit.  General Appearance: Well groomed, hygenic  Respiratory: No cough, no respiratory distress or labored breathing  Musculoskeletal: Grossly normal with no gross deficits  Skin: No discoloration or apparent rashes  Neurologic: No tremors  Psychiatric: Alert and oriented  Further examination is deferred due to the nature of our visit.

## 2024-07-20 ENCOUNTER — NURSE TRIAGE (OUTPATIENT)
Dept: NURSING | Facility: CLINIC | Age: 34
End: 2024-07-20
Payer: COMMERCIAL

## 2024-07-21 NOTE — TELEPHONE ENCOUNTER
Patient calling. She is currently passing a kidney stone. She was seen by multiple providers this week, including a urology specialist on Friday, who prescribed oral toradol, because she is reluctant to take the prescribed morphine.     She states that at this time, the toradol is less effective.     Patient was encouraged to try the previously prescribed morphine IR that she was previously prescribed. Patient states that she will take a 1/2 tab to try it.     Patient was cautioned regarding taking ibuprofen with toradol is unsafe, which she agreed with, stating she has  not been doing. She was cautioned regarding risk of hypotension and constipation with the morphine. She stated understanding of the precautions regarding these possibilities. Patient stated that she had also taken a half tab of dramamine, and was cautioned regarding the risk of respiratory depression with the combination of the two medications.     Care advice given for home care.     Ilsa Nelson RN  [facility]  July 21, 2024, 12:11 AM    Reason for Disposition   Caller has medicine question, adult has minor symptoms, caller declines triage, AND triager answers question    Additional Information   Negative: [1] Intentional drug overdose AND [2] suicidal thoughts or ideas   Negative: Drug overdose and triager unable to answer question   Negative: Caller requesting a renewal or refill of a medicine patient is currently taking   Negative: Caller requesting information unrelated to medicine   Negative: Caller requesting information about COVID-19 Vaccine   Negative: Caller requesting information about Emergency Contraception   Negative: Caller requesting information about Combined Birth Control Pills   Negative: Caller requesting information about Progestin Birth Control Pills   Negative: Caller requesting information about Post-Op pain or medicines   Negative: Caller requesting a prescription antibiotic (such as Penicillin) for Strep  throat and has a positive culture result   Negative: Caller requesting a prescription anti-viral med (such as Tamiflu) and has influenza (flu) symptoms   Negative: Immunization reaction suspected   Negative: Rash while taking a medicine or within 3 days of stopping it   Negative: [1] Asthma and [2] having symptoms of asthma (cough, wheezing, etc.)   Negative: [1] Symptom of illness (e.g., headache, abdominal pain, earache, vomiting) AND [2] more than mild   Negative: Breastfeeding questions about mother's medicines and diet   Negative: MORE THAN A DOUBLE DOSE of a prescription or over-the-counter (OTC) drug   Negative: [1] DOUBLE DOSE (an extra dose or lesser amount) of prescription drug AND [2] any symptoms (e.g., dizziness, nausea, pain, sleepiness)   Negative: [1] DOUBLE DOSE (an extra dose or lesser amount) of over-the-counter (OTC) drug AND [2] any symptoms (e.g., dizziness, nausea, pain, sleepiness)   Negative: Took another person's prescription drug   Negative: [1] DOUBLE DOSE (an extra dose or lesser amount) of prescription drug AND [2] NO symptoms  (Exception: A double dose of antibiotics.)   Negative: Diabetes drug error or overdose (e.g., took wrong type of insulin or took extra dose)   Negative: [1] Prescription not at pharmacy AND [2] was prescribed by PCP recently (Exception: Triager has access to EMR and prescription is recorded there. Go to Home Care and confirm for pharmacy.)   Negative: [1] Pharmacy calling with prescription question AND [2] triager unable to answer question   Negative: [1] Caller has URGENT medicine question about med that PCP or specialist prescribed AND [2] triager unable to answer question   Negative: Medicine patch causing local rash or itching   Negative: [1] Caller has medicine question about med NOT prescribed by PCP AND [2] triager unable to answer question (e.g., compatibility with other med, storage)   Negative: Prescription request for new medicine (not a refill)    Negative: [1] Caller has NON-URGENT medicine question about med that PCP prescribed AND [2] triager unable to answer question   Negative: Caller wants to use a complementary or alternative medicine   Negative: [1] Prescription prescribed recently is not at pharmacy AND [2] triager has access to patient's EMR AND [3] prescription is recorded in the EMR   Negative: [1] DOUBLE DOSE (an extra dose or lesser amount) of over-the-counter (OTC) drug AND [2] NO symptoms   Negative: [1] DOUBLE DOSE (an extra dose or lesser amount) of antibiotic drug AND [2] NO symptoms   Negative: Caller has medicine question only, adult not sick, AND triager answers question    Protocols used: Medication Question Call-A-AH

## 2024-07-22 ENCOUNTER — TELEPHONE (OUTPATIENT)
Dept: UROLOGY | Facility: CLINIC | Age: 34
End: 2024-07-22
Payer: COMMERCIAL

## 2024-07-22 ENCOUNTER — TELEPHONE (OUTPATIENT)
Dept: FAMILY MEDICINE | Facility: CLINIC | Age: 34
End: 2024-07-22
Payer: COMMERCIAL

## 2024-07-22 DIAGNOSIS — N20.1 RIGHT URETERAL STONE: Primary | ICD-10-CM

## 2024-07-22 NOTE — TELEPHONE ENCOUNTER
Reason for Call:  Appointment Request    Patient requesting this type of appt: Pre-op    Requested provider:  Braulio    Reason patient unable to be scheduled: Not within requested timeframe    When does patient want to be seen/preferred time: 1-2 days    Comments: Pt has preop on Friday 7.26.24, would like to see Braulio again for this before then, please contact pt to advise, no slots for csol to schedule    Could we send this information to you in [facility] or would you prefer to receive a phone call?:   Patient would prefer a phone call   Okay to leave a detailed message?: Yes at Home number on file [phone] (home)    Call taken on 7/22/2024 at 9:54 AM by Joseph Vanessa

## 2024-07-22 NOTE — TELEPHONE ENCOUNTER
Procedure: CYSTOURETEROSCOPY, WITH RETROGRADE PYELOGRAM, HOLMIUM LASER LITHOTRIPSY OF URETERAL CALCULUS, AND STENT INSERTION     Date: 07/26   Provider: Phi  Time: [facility] 1200, time may change. Arrive 1.5 hour prior    Pre op appt: Pt will try to get it with PCP  UA/UC: UA in ED, negative    Reviewed medications (anticoagulants, diabetes medications etc): reviewed, ok to cont pain medications until DOS  Soap: reviewed  Reviewed when to start clear liquids and when to start NPO: yes  Confirmed pt has  and 24 hour observation: yes    Pt or family member expressed understanding: yes    Claudia Davis RN  7/22/2024  9:14 AM

## 2024-07-22 NOTE — TELEPHONE ENCOUNTER
Spoke with: Patient       Date of surgery: Friday July 26 2024 with Dr Yip       Location: [facility]       Informed patient they will need a adult : YES      Pre op with provider:  patient does not have a PCP patient given the  PCP scheduling number she will schedule a Pre op      H&P Scheduled in PAC- NA         Pre procedure covid : NA       Additional imaging: na         Surgery Packet : sent via [employer]      Additional comments: please call for surgery teaching

## 2024-07-22 NOTE — TELEPHONE ENCOUNTER
Pt called RN to report struggling with symptom management r/h her 5mm ureteral stone. She had a visit with Nata Palomares where they discussed monitoring and repeating CT in a week, however pt is now wanting to pursue surgical intervention d/t pain.    Message sent to Nata. Pt is aware to seek care at ED if pain becomes to severe to manage at home.     NEGRA Tayolr  Care Coordinator- Urology   [phone]

## 2024-07-23 DIAGNOSIS — N20.1 RIGHT URETERAL CALCULUS: ICD-10-CM

## 2024-07-23 RX ORDER — KETOROLAC TROMETHAMINE 10 MG/1
10 TABLET, FILM COATED ORAL EVERY 6 HOURS PRN
Qty: 20 TABLET | Refills: 0 | Status: SHIPPED | OUTPATIENT
Start: 2024-07-23 | End: 2024-07-31

## 2024-07-23 NOTE — TELEPHONE ENCOUNTER
Is with the understanding because she has surgery at end of week she does not need to schedule the CT.

## 2024-07-24 VITALS — WEIGHT: 198 LBS | HEIGHT: 67 IN | BODY MASS INDEX: 31.08 KG/M2

## 2024-07-24 RX ORDER — DROSPIRENONE 4 MG/1
1 TABLET, FILM COATED ORAL DAILY
COMMUNITY
Start: 2024-06-25

## 2024-07-25 ENCOUNTER — OFFICE VISIT (OUTPATIENT)
Dept: FAMILY MEDICINE | Facility: CLINIC | Age: 34
End: 2024-07-25
Payer: COMMERCIAL

## 2024-07-25 VITALS
BODY MASS INDEX: 32.02 KG/M2 | DIASTOLIC BLOOD PRESSURE: 76 MMHG | HEIGHT: 67 IN | RESPIRATION RATE: 18 BRPM | WEIGHT: 204 LBS | SYSTOLIC BLOOD PRESSURE: 110 MMHG | OXYGEN SATURATION: 100 % | HEART RATE: 71 BPM | TEMPERATURE: 97.9 F

## 2024-07-25 DIAGNOSIS — J30.2 SEASONAL ALLERGIC RHINITIS, UNSPECIFIED TRIGGER: ICD-10-CM

## 2024-07-25 DIAGNOSIS — Z01.818 PREOP GENERAL PHYSICAL EXAM: Primary | ICD-10-CM

## 2024-07-25 DIAGNOSIS — N20.1 RIGHT URETERAL CALCULUS: ICD-10-CM

## 2024-07-25 DIAGNOSIS — E78.49 FAMILIAL HYPERLIPIDEMIA: ICD-10-CM

## 2024-07-25 DIAGNOSIS — Z86.2 HISTORY OF GRANULOMATOUS DISEASE: ICD-10-CM

## 2024-07-25 PROCEDURE — 99214 OFFICE O/P EST MOD 30 MIN: CPT | Performed by: PHYSICIAN ASSISTANT

## 2024-07-25 ASSESSMENT — PAIN SCALES - GENERAL: PAINLEVEL: NO PAIN (1)

## 2024-07-25 NOTE — PROGRESS NOTES
Preoperative Evaluation  [address] [address]  Phone: [phone]  Primary Provider: [facility]  Pre-op Performing Provider: [facility]  Jul 25, 2024 7/24/2024   Surgical Information   What procedure is being done? Kidney Stone Removal   Facility or Hospital where procedure/surgery will be performed: [facility]   Who is doing the procedure / surgery? Dr Hayes   Date of surgery / procedure: 7/26/2024/ Kidney Stone Removal   Time of surgery / procedure: 12pm   Where do you plan to recover after surgery? at home with family        Fax number for surgical facility: Note does not need to be faxed, will be available electronically in Epic.    Assessment & Plan     The proposed surgical procedure is considered INTERMEDIATE risk.    Preop general physical exam  Cleared for procedure    Seasonal allergic rhinitis, unspecified trigger  Chronic, stable, continue with current treatment regimen    Right ureteral calculus  Surgery schedule for removal tomorrow. However patient has noticed increase in urinating and has concerns for kidney has moved to bladder and doesn't know if procedure is needed. Advised to discuss with surgical staff tomorrow to determine.     Familial hyperlipidemia  Stable, no medications, does lifestyle changes    History of granulomatous disease  Noted incidentally on CT scan unclear when this occurred. Discuss briefly about Pulmonology for further discussion but patient declined a this time.             - No identified additional risk factors other than previously addressed    Preoperative Medication Instructions  Antiplatelet or Anticoagulation Medication Instructions   - Patient is on no antiplatelet or anticoagulation medications.    Additional Medication Instructions  HOLD all medication the morning of procedure    Recommendation  Approval given to proceed with proposed procedure, without  further diagnostic evaluation.    Leandro De La Cruz is a 34 year old, presenting for the following:  Pre-Op Exam (/)        HPI related to upcoming procedure: Patient was diagnosed with a kidney stone. It was 6 mm and not passing. She has been on pain medications and medications to help try to get it to pass. It has been unsuccessful. So surgery is recommended for removal.         7/24/2024   Pre-Op Questionnaire   Have you ever had a heart attack or stroke? No   Have you ever had surgery on your heart or blood vessels, such as a stent placement, a coronary artery bypass, or surgery on an artery in your head, neck, heart, or legs? No   Do you have chest pain with activity? No   Do you have a history of heart failure? No   Do you currently have a cold, bronchitis or symptoms of other infection? No   Do you have a cough, shortness of breath, or wheezing? No   Do you or anyone in your family have previous history of blood clots? No   Do you or does anyone in your family have a serious bleeding problem such as prolonged bleeding following surgeries or cuts? No   Have you ever had problems with anemia or been told to take iron pills? No   Have you had any abnormal blood loss such as black, tarry or bloody stools, or abnormal vaginal bleeding? No   Have you ever had a blood transfusion? No   Are you willing to have a blood transfusion if it is medically needed before, during, or after your surgery? Yes   Have you or any of your relatives ever had problems with anesthesia? (!) UNKNOWN    Do you have sleep apnea, excessive snoring or daytime drowsiness? No   Do you have any artifical heart valves or other implanted medical devices like a pacemaker, defibrillator, or continuous glucose monitor? No   Do you have artificial joints? No   Are you allergic to latex? No        Health Care Directive  Patient does not have a Health Care Directive or Living Will: Discussed advance care planning with patient; however, patient  declined at this time.    Preoperative Review of    reviewed - she was taking pain medications but has completed them      Status of Chronic Conditions:  See Assessment and Plan    Patient Active Problem List    Diagnosis Date Noted    Familial hyperlipidemia 07/25/2024     Priority: Medium    History of granulomatous disease 07/25/2024     Priority: Medium     Lungs  - see on CT scan in 2024      Right ureteral stone 07/22/2024     Priority: Medium    Right ureteral calculus 07/15/2024     Priority: Medium     5 mm      Seasonal allergic rhinitis, unspecified trigger 10/26/2021     Priority: Medium      Past Medical History:   Diagnosis Date    Anxiety     homeopathic remedies, therapy    Depressive disorder N/A    I have had this for many years    Gastroesophageal reflux disease     Renal disease      Past Surgical History:   Procedure Laterality Date    BIOPSY  Dont remember    It was benign.    DENTAL SURGERY  2013    wisdom teeth     Current Outpatient Medications   Medication Sig Dispense Refill    drospirenone (SLYND) 4 MG TABS tablet Take 1 tablet by mouth daily      ketorolac (TORADOL) 10 MG tablet Take 1 tablet (10 mg) by mouth every 6 hours as needed for moderate pain 20 tablet 0    ondansetron (ZOFRAN ODT) 4 MG ODT tab Take 4 mg by mouth (Patient not taking: Reported on 7/25/2024)      terazosin (HYTRIN) 2 MG capsule Take 1 capsule (2 mg) by mouth at bedtime (Patient not taking: Reported on 7/25/2024) 30 capsule 0    VITAMIN D PO  (Patient not taking: Reported on 7/25/2024)         Allergies   Allergen Reactions    Cephalexin Rash     Rash neck, side        Social History     Tobacco Use    Smoking status: Never     Passive exposure: Never    Smokeless tobacco: Never   Substance Use Topics    Alcohol use: Yes     Comment: occ       History   Drug Use     Comment: edibles             Review of Systems  Constitutional, HEENT, cardiovascular, pulmonary, GI, , musculoskeletal, neuro, skin, endocrine  "and psych systems are negative, except as otherwise noted.    Objective    /76   Pulse 71   Temp 97.9  F (36.6  C) (Oral)   Resp 18   Ht 1.702 m (5' 7\")   Wt 92.5 kg (204 lb)   LMP 06/25/2024 (Exact Date)   SpO2 100%   BMI 31.95 kg/m     Estimated body mass index is 31.95 kg/m  as calculated from the following:    Height as of this encounter: 1.702 m (5' 7\").    Weight as of this encounter: 92.5 kg (204 lb).  Physical Exam  GENERAL: alert and no distress  EYES: Eyes grossly normal to inspection, PERRL and conjunctivae and sclerae normal  HENT: ear canals and TM's normal, nose and mouth without ulcers or lesions  NECK: no adenopathy, no asymmetry, masses, or scars  RESP: lungs clear to auscultation - no rales, rhonchi or wheezes  CV: regular rate and rhythm, normal S1 S2, no S3 or S4, no murmur, click or rub, no peripheral edema  ABDOMEN: soft, nontender, no hepatosplenomegaly, no masses and bowel sounds normal  MS: no gross musculoskeletal defects noted, no edema  SKIN: no suspicious lesions or rashes  NEURO: Normal strength and tone, mentation intact and speech normal  PSYCH: mentation appears normal, affect normal/bright    No results for input(s): \"HGB\", \"PLT\", \"INR\", \"NA\", \"POTASSIUM\", \"CR\", \"A1C\" in the last 8760 hours.     Diagnostics  No labs were ordered during this visit.   No EKG required, no history of coronary heart disease, significant arrhythmia, peripheral arterial disease or other structural heart disease.    Revised Cardiac Risk Index (RCRI)  The patient has the following serious cardiovascular risks for perioperative complications:   - No serious cardiac risks = 0 points     RCRI Interpretation: 0 points: Class I (very low risk - 0.4% complication rate)         Signed Electronically by: [facility]  A copy of this evaluation report is provided to the requesting physician.         "

## 2024-07-25 NOTE — PATIENT INSTRUCTIONS

## 2024-07-26 ENCOUNTER — HOSPITAL ENCOUNTER (OUTPATIENT)
Facility: AMBULATORY SURGERY CENTER | Age: 34
End: 2024-07-26
Attending: UROLOGY
Payer: COMMERCIAL

## 2024-07-26 ENCOUNTER — TELEPHONE (OUTPATIENT)
Dept: UROLOGY | Facility: CLINIC | Age: 34
End: 2024-07-26
Payer: COMMERCIAL

## 2024-07-26 DIAGNOSIS — N20.1 RIGHT URETERAL CALCULUS: Primary | ICD-10-CM

## 2024-07-26 RX ORDER — LIDOCAINE 40 MG/G
CREAM TOPICAL
Status: SHIPPED | OUTPATIENT
Start: 2024-07-26

## 2024-07-26 RX ORDER — SODIUM CHLORIDE, SODIUM LACTATE, POTASSIUM CHLORIDE, CALCIUM CHLORIDE 600; 310; 30; 20 MG/100ML; MG/100ML; MG/100ML; MG/100ML
INJECTION, SOLUTION INTRAVENOUS CONTINUOUS
Status: SHIPPED | OUTPATIENT
Start: 2024-07-26

## 2024-07-26 RX ORDER — ACETAMINOPHEN 325 MG/1
975 TABLET ORAL ONCE
Status: SHIPPED | OUTPATIENT
Start: 2024-07-26

## 2024-07-26 RX ORDER — PHENAZOPYRIDINE HYDROCHLORIDE 200 MG/1
200 TABLET, FILM COATED ORAL 3 TIMES DAILY PRN
Qty: 12 TABLET | Refills: 0 | Status: SHIPPED | OUTPATIENT
Start: 2024-07-26

## 2024-07-26 RX ORDER — OXYBUTYNIN CHLORIDE 5 MG/1
5 TABLET, EXTENDED RELEASE ORAL DAILY
Qty: 14 TABLET | Refills: 0 | Status: SHIPPED | OUTPATIENT
Start: 2024-07-26

## 2024-07-26 NOTE — TELEPHONE ENCOUNTER
Pt returned call and CT scheduled for Tuesday afternoon.    NEGRA Taylor  Care Coordinator- Urology   [phone]

## 2024-07-26 NOTE — TELEPHONE ENCOUNTER
Left message for pt regarding cancelled procedure today. Pt felt the stone may pass soon and elected to wait over the weekend.     Per Dr. Yip, repeat CT on Tuesday or Wednesday if no evidence of physical passage.    Orders were placed by MD. Taylor, RN  Care Coordinator- Urology   [phone]

## 2024-07-30 ENCOUNTER — HOSPITAL ENCOUNTER (OUTPATIENT)
Dept: CT IMAGING | Facility: HOSPITAL | Age: 34
Discharge: HOME | End: 2024-07-30
Attending: UROLOGY | Admitting: UROLOGY
Payer: COMMERCIAL

## 2024-07-30 PROCEDURE — 74176 CT ABD & PELVIS W/O CONTRAST: CPT

## 2024-08-06 DIAGNOSIS — N20.1 RIGHT URETERAL CALCULUS: ICD-10-CM

## 2024-08-09 RX ORDER — OXYBUTYNIN CHLORIDE 5 MG/1
5 TABLET, EXTENDED RELEASE ORAL DAILY
Qty: 14 TABLET | Refills: 0 | OUTPATIENT
Start: 2024-08-09

## 2024-08-09 NOTE — TELEPHONE ENCOUNTER
Signed 2 weeks ago (7/26/2024):   oxyBUTYnin ER (DITROPAN XL) 5 MG 24 hr tablet   Sig: Take 1 tablet (5 mg) by mouth daily   Disp: 14 tablet    Refills: 0   Signed by: Bebeto Yip MD

## 2024-08-11 DIAGNOSIS — N20.1 RIGHT URETERAL CALCULUS: ICD-10-CM

## 2024-08-12 RX ORDER — TERAZOSIN 2 MG/1
2 CAPSULE ORAL AT BEDTIME
Qty: 90 CAPSULE | OUTPATIENT
Start: 2024-08-12

## 2024-08-16 ENCOUNTER — HOSPITAL ENCOUNTER (OUTPATIENT)
Dept: GENERAL RADIOLOGY | Facility: HOSPITAL | Age: 34
Discharge: HOME | End: 2024-08-16
Attending: UROLOGY | Admitting: UROLOGY
Payer: COMMERCIAL

## 2024-08-16 DIAGNOSIS — N20.1 RIGHT URETERAL CALCULUS: ICD-10-CM

## 2024-08-16 PROCEDURE — 74018 RADEX ABDOMEN 1 VIEW: CPT

## 2024-08-19 ENCOUNTER — VIRTUAL VISIT (OUTPATIENT)
Dept: UROLOGY | Facility: CLINIC | Age: 34
End: 2024-08-19
Payer: COMMERCIAL

## 2024-08-19 DIAGNOSIS — N20.1 RIGHT URETERAL STONE: Primary | ICD-10-CM

## 2024-08-19 PROCEDURE — 99214 OFFICE O/P EST MOD 30 MIN: CPT | Mod: 95 | Performed by: NURSE PRACTITIONER

## 2024-08-19 ASSESSMENT — PAIN SCALES - GENERAL: PAINLEVEL: NO PAIN (0)

## 2024-08-19 NOTE — NURSING NOTE
Pt stated they stopped taking pain meds, no longer having pain    Current patient location: Patient declined to provide     Is the patient currently in the state of [place]? YES    Visit mode:VIDEO    If the visit is dropped, the patient can be reconnected by: VIDEO VISIT: Text to cell phone:   Telephone Information:   Mobile [phone]       Will anyone else be joining the visit? NO  (If patient encounters technical issues they should call [phone]    How would you like to obtain your AVS? MyChart    Are changes needed to the allergy or medication list? No    Are refills needed on medications prescribed by this physician? NO    Rooming Documentation:  Not applicable      Reason for visit: RECHECK (2 weeks with arvind 8/16/)    Basilia HODGES

## 2024-08-19 NOTE — PATIENT INSTRUCTIONS
UROLOGY CLINIC VISIT PATIENT INSTRUCTIONS    -If having severe flank pain, fevers, chills, nausea, or vomiting please notify Urology clinic or be seen in the ER.      If you have any issues, questions or concerns in the meantime, do not hesitate to contact us at [facility] at [phone] or via [employer].     Paloma Phelan CNP  Department of Urology

## 2024-08-19 NOTE — PROGRESS NOTES
Urology Video Office Visit    Video-Visit Details    Type of service:  Video Visit    Video Start Time: 0757    Video End Time: 0825    Originating Location (pt. Location): Home    Distant Location (provider location):  Off-site     Platform used for Video Visit: Sponge           Assessment and Plan:     Assessment:34 year old female with a right 4mm UVJ stone    Plan:  -Reviewed KUB with patient. Noted questionable right UVJ stone. Recommend further imaging for evaluation. Discussed option of renal US vs CT scan. Will obtain renal US. If unable to visualize stone or if stone is still present will obtain a CT scan in 2 weeks.   -The patient and I discussed the diagnosis and natural history of urolithiasis. Discussed option of 24 hour urine study for further evaluation for risk of stones. Pt amenable to plan. Will send litholink kit x 1. Recommend not to complete until right UVJ stone passage has been confirmed.   -If having severe flank pain, fevers, chills, nausea, or vomiting please notify Urology clinic or be seen in the ER.   -RTC in 6-8 weeks    Paloma Phelan CNP  Department of Urology  August 19, 2024    I spent a total of 30 minutes spent on the date of the encounter doing chart review, history and exam, documentation, and further activities as noted above.          Chief Complaint:   Right UVJ stone         History of Present Illness:    Jono Gillis is a pleasant 34 year old female who presents for follow up of a right UVJ stone.     Ms. Gillis was last seen with Urology on 7/31/24 for a symptomatic right UVJ stone. She was initially wanting to proceed with a definitive stone procedure however to due lessen of symptoms and high probability for stone passage she opted for spontaneous passage.     She is doing well at this time. She has stopped taking her pain medication. Has occasionally taken acetaminophen. Continues to have ongoing symptoms urinary frequency. She has not seen her stone pass at  "this time. She has been vigilant about straining her urine at home however unable to strain outside of home.     KUB on 8/16/24 (images personally reviewed) revealed  \"small dense focus seen in the expected region of the right ureterovesicular junction could represent persistent previously noted 4 mm UVJ stone, however examination is somewhat limited as there is some overlying rectosigmoid gas and stool in this region. If clinically indicated this could be further evaluated with CT. Nonobstructed bowel gas pattern otherwise. No acute osseous abnormality.\"    This is her first stone episode. Family history of nephrolithiasis in mother.     Stone Risk Factors: None         Past Medical History:     Past Medical History:   Diagnosis Date    Anxiety     homeopathic remedies, therapy    Depressive disorder N/A    I have had this for many years    Gastroesophageal reflux disease     Renal disease             Past Surgical History:     Past Surgical History:   Procedure Laterality Date    BIOPSY  Dont remember    It was benign.    DENTAL SURGERY  2013    wisdom teeth            Medications     Current Outpatient Medications   Medication Sig Dispense Refill    drospirenone (SLYND) 4 MG TABS tablet Take 1 tablet by mouth daily      ketorolac (TORADOL) 10 MG tablet Take 1 tablet (10 mg) by mouth every 6 hours as needed for moderate pain 20 tablet 0    ondansetron (ZOFRAN ODT) 4 MG ODT tab Take 4 mg by mouth (Patient not taking: Reported on 7/25/2024)      oxyBUTYnin ER (DITROPAN XL) 5 MG 24 hr tablet Take 1 tablet (5 mg) by mouth daily 14 tablet 0    phenazopyridine (PYRIDIUM) 200 MG tablet Take 1 tablet (200 mg) by mouth 3 times daily as needed 12 tablet 0    terazosin (HYTRIN) 2 MG capsule Take 1 capsule (2 mg) by mouth at bedtime (Patient not taking: Reported on 7/25/2024) 30 capsule 0    VITAMIN D PO  (Patient not taking: Reported on 7/25/2024)       No current facility-administered medications for this visit. "     Facility-Administered Medications Ordered in Other Visits   Medication Dose Route Frequency Provider Last Rate Last Admin    acetaminophen (TYLENOL) tablet 975 mg  975 mg Oral Once Essie Ratliff MD        lactated ringers infusion   Intravenous Continuous Essie Ratliff MD        lidocaine (LMX4) kit   Topical Q1H PRN Essie Ratliff MD        lidocaine 1 % 0.1-1 mL  0.1-1 mL Other Q1H PRN Essie Ratliff MD        sodium chloride (PF) 0.9% PF flush 3 mL  3 mL Intracatheter Q8H Essie Ratliff MD        sodium chloride (PF) 0.9% PF flush 3 mL  3 mL Intracatheter q1 min prn Essie Ratliff MD                Family History:     Family History   Problem Relation Age of Onset    Anxiety Disorder Mother     Depression Mother     Hyperlipidemia Mother     Breast Cancer Maternal Grandmother         late 70's, not genetic    Breast Cancer Maternal Aunt         mid 50's    Anxiety Disorder Maternal Aunt     Depression Maternal Aunt     Breast Cancer Other         My Maternal Aunt    Melanoma No family hx of     Skin Cancer No family hx of             Social History:     Social History     Socioeconomic History    Marital status:      Spouse name: Not on file    Number of children: 0    Years of education: Not on file    Highest education level: Not on file   Occupational History    Occupation: director of student programs     Comment: [employer]   Tobacco Use    Smoking status: Never     Passive exposure: Never    Smokeless tobacco: Never   Vaping Use    Vaping status: Never Used   Substance and Sexual Activity    Alcohol use: Yes     Comment: occ    Drug use: Yes     Comment: edibles    Sexual activity: Yes     Partners: Male     Birth control/protection: Condom, Natural Family Planning   Other Topics Concern    Parent/sibling w/ CABG, MI or angioplasty before 65F 55M? No   Social History Narrative    Not on file     Social  "Determinants of Health     Financial Resource Strain: Not on file   Food Insecurity: Not on file   Transportation Needs: Not on file   Physical Activity: Not on file   Stress: Not on file   Social Connections: Not on file   Interpersonal Safety: Not on file   Housing Stability: Not on file            Allergies:   Cephalexin         Review of Systems:  From intake questionnaire   Negative 14 system review except as noted on HPI, nurse's note.         Physical Exam:   General Appearance: Well groomed, hygenic  Eyes: No redness, discharge  Respiratory: No cough, no respiratory distress or labored breathing  Musculoskeletal: Grossly normal, full range of motion in upper extremities, no gross deficits  Skin: No discoloration or apparent rashes  Neurologic - No tremors  Psychiatric - Alert and oriented  The rest of a comprehensive physical examination is deferred due to video visit restrictions        Labs:    I personally reviewed all applicable laboratory data and went over findings with patient  Significant for:    CBC RESULTS:  Recent Labs   Lab Test 10/26/21  1140 09/03/19  1621 06/14/17  0928   WBC 7.2 7.5 5.0   HGB 13.8 13.5 14.0    265 254        BMP RESULTS:  Recent Labs   Lab Test 10/26/21  1140      POTASSIUM 3.7   CHLORIDE 105   CO2 20   ANIONGAP 10   GLC 85   BUN 9   CR 0.81   GFRESTIMATED >90   TRACY 8.6       UA RESULTS:   No results for input(s): \"SG\", \"URINEPH\", \"NITRITE\", \"RBCU\", \"WBCU\" in the last 59254 hours.    CALCIUM RESULTS  Lab Results   Component Value Date    TRACY 8.6 10/26/2021           Imaging:    I personally reviewed all applicable imaging and went over the below findings with patient.    Results for orders placed or performed during the hospital encounter of 08/16/24   XR Abdomen 1 View    Narrative    EXAM: XR ABDOMEN 1 VIEW  LOCATION: [facility]  DATE: 8/16/2024    INDICATION:  Right ureteral calculus  COMPARISON: None.      Impression    IMPRESSION: " Small dense focus seen in the expected region of the right ureterovesicular junction could represent persistent previously noted 4 mm UVJ stone, however examination is somewhat limited as there is some overlying rectosigmoid gas and stool in   this region. If clinically indicated this could be further evaluated with CT. Nonobstructed bowel gas pattern otherwise. No acute osseous abnormality.

## 2024-08-20 ENCOUNTER — TELEPHONE (OUTPATIENT)
Dept: UROLOGY | Facility: CLINIC | Age: 34
End: 2024-08-20
Payer: COMMERCIAL

## 2024-08-30 ENCOUNTER — TELEPHONE (OUTPATIENT)
Dept: UROLOGY | Facility: CLINIC | Age: 34
End: 2024-08-30
Payer: COMMERCIAL

## 2024-08-30 NOTE — TELEPHONE ENCOUNTER
Request re-ordered to [facility] to send patient 1 24hour testing kit.  Liz Parkinson RN

## 2024-09-04 ENCOUNTER — OFFICE VISIT (OUTPATIENT)
Dept: DERMATOLOGY | Facility: CLINIC | Age: 34
End: 2024-09-04
Payer: COMMERCIAL

## 2024-09-04 DIAGNOSIS — D22.9 MULTIPLE BENIGN NEVI: Primary | ICD-10-CM

## 2024-09-04 DIAGNOSIS — D18.01 CHERRY ANGIOMA: ICD-10-CM

## 2024-09-04 DIAGNOSIS — L81.0 POSTINFLAMMATORY HYPERPIGMENTATION: ICD-10-CM

## 2024-09-04 DIAGNOSIS — L81.4 LENTIGINES: ICD-10-CM

## 2024-09-04 DIAGNOSIS — Z12.83 ENCOUNTER FOR SCREENING FOR MALIGNANT NEOPLASM OF SKIN: ICD-10-CM

## 2024-09-04 DIAGNOSIS — L82.1 SEBORRHEIC KERATOSES: ICD-10-CM

## 2024-09-04 PROCEDURE — 99203 OFFICE O/P NEW LOW 30 MIN: CPT | Performed by: NURSE PRACTITIONER

## 2024-09-04 ASSESSMENT — PAIN SCALES - GENERAL: PAINLEVEL: NO PAIN (0)

## 2024-09-04 NOTE — LETTER
9/4/2024      Jono Gillis  [address]  [facility] [address]      Dear Colleague,    Thank you for referring your patient, Jono Gillis, to the [facility] [place]. Please see a copy of my visit note below.    [facility] Dermatology Note  Encounter Date: Sep 4, 2024  Office Visit     Reviewed patients past medical history and pertinent chart review prior to patients visit today.     Dermatology Problem List:  Patient denies personal history of skin cancer or dysplastic nevi.    Family history of BCC in her mother, no known family history of melanoma    ____________________________________________    Assessment & Plan:     # Postinflammatory hyperpigmentation of the lower legs.  Advised to do diligent sun protection to minimize the darkening of the scars.  Advised that they may fade over time or stay a bit darkened.  No need for further treatment.    # Benign skin findings including: seborrheic keratoses, cherry angioma, lentigines and benign nevi.   - No further intervention required. Patient to report changes.   - Patient reassured of the benign nature of these lesions.    #Signs and Symptoms of non-melanoma skin cancer and ABCDEs of melanoma reviewed with patient. Patient encouraged to perform monthly self skin exams and educated on how to perform them. UV precautions reviewed with patient. Patient was asked about new or changing moles/lesions on body.     #Reviewed Sunscreen: Apply 20 minutes prior to going outdoors and reapply every two hours, when wet or sweating. We recommend using an SPF 30 or higher, and to use one that is water resistant.       Follow-up:  1-2 years for follow up full body skin exam, prn for new or changing lesions or new concerns    Ashley Grey CNP  Dermatology     ____________________________________________    CC: Skin Check    HPI:  Ms. Jono Gillis is a(n) 34 year old female who presents today as a new patient for a full body skin  cancer screening. Patient has concerns today about some darkened spots she had on the lower legs.  She states that she had a fungal infection about a year ago and it left some marks on the lower legs.  She wonders if there is still an infection there or if this is just left over from the rash..     Patient is otherwise feeling well, without additional skin concerns.     Physical Exam:  Vitals: LMP 06/25/2024 (Exact Date)   Breastfeeding No   SKIN: Total skin excluding the genitalia areas was performed. The exam included the head/face, neck, both arms, chest, back, abdomen, both legs, digits, mons pubis, buttock and nails.   -A few patches of ill-defined light brown hyperpigmentation on the anterior lower legs  -several 1-2mm red dome shaped symmetric papules scattered on the trunk  -multiple tan/brown flat round macules and raised papules scattered throughout trunk, extremities and head. No worrisome features for malignancy noted on examination.  -scattered tan, homogenous macules scattered on sun exposed areas of trunk, extremities and face.   -scattered waxy, stuck on tan/brown small papules on trunk face and extremities    - No other lesions of concern on areas examined.     Medications:  Current Outpatient Medications   Medication Sig Dispense Refill     drospirenone (SLYND) 4 MG TABS tablet Take 1 tablet by mouth daily       ketorolac (TORADOL) 10 MG tablet Take 1 tablet (10 mg) by mouth every 6 hours as needed for moderate pain (Patient not taking: Reported on 9/4/2024) 20 tablet 0     ondansetron (ZOFRAN ODT) 4 MG ODT tab Take 4 mg by mouth (Patient not taking: Reported on 7/25/2024)       oxyBUTYnin ER (DITROPAN XL) 5 MG 24 hr tablet Take 1 tablet (5 mg) by mouth daily (Patient not taking: Reported on 9/4/2024) 14 tablet 0     phenazopyridine (PYRIDIUM) 200 MG tablet Take 1 tablet (200 mg) by mouth 3 times daily as needed (Patient not taking: Reported on 9/4/2024) 12 tablet 0     terazosin (HYTRIN) 2 MG  capsule Take 1 capsule (2 mg) by mouth at bedtime (Patient not taking: Reported on 7/25/2024) 30 capsule 0     VITAMIN D PO  (Patient not taking: Reported on 9/4/2024)       No current facility-administered medications for this visit.     Facility-Administered Medications Ordered in Other Visits   Medication Dose Route Frequency Provider Last Rate Last Admin     acetaminophen (TYLENOL) tablet 975 mg  975 mg Oral Once Essie Ratliff MD         lactated ringers infusion   Intravenous Continuous Essie Ratliff MD         lidocaine (LMX4) kit   Topical Q1H PRN Essie Ratliff MD         lidocaine 1 % 0.1-1 mL  0.1-1 mL Other Q1H PRN Essie Ratliff MD         sodium chloride (PF) 0.9% PF flush 3 mL  3 mL Intracatheter Q8H Essie Ratliff MD         sodium chloride (PF) 0.9% PF flush 3 mL  3 mL Intracatheter q1 min prn Essie Ratliff MD          Past Medical History:   Patient Active Problem List   Diagnosis     Seasonal allergic rhinitis, unspecified trigger     Right ureteral calculus     Right ureteral stone     Familial hyperlipidemia     History of granulomatous disease     Past Medical History:   Diagnosis Date     Anxiety     homeopathic remedies, therapy     Depressive disorder N/A    I have had this for many years     Gastroesophageal reflux disease      Renal disease        CC Referred Self, MD  No address on file on close of this encounter.      Again, thank you for allowing me to participate in the care of your patient.        Sincerely,        Radha Grey, KRISTINE CNP

## 2024-09-04 NOTE — PROGRESS NOTES
[facility] Dermatology Note  Encounter Date: Sep 4, 2024  Office Visit     Reviewed patients past medical history and pertinent chart review prior to patients visit today.     Dermatology Problem List:  Patient denies personal history of skin cancer or dysplastic nevi.    Family history of BCC in her mother, no known family history of melanoma    ____________________________________________    Assessment & Plan:     # Postinflammatory hyperpigmentation of the lower legs.  Advised to do diligent sun protection to minimize the darkening of the scars.  Advised that they may fade over time or stay a bit darkened.  No need for further treatment.    # Benign skin findings including: seborrheic keratoses, cherry angioma, lentigines and benign nevi.   - No further intervention required. Patient to report changes.   - Patient reassured of the benign nature of these lesions.    #Signs and Symptoms of non-melanoma skin cancer and ABCDEs of melanoma reviewed with patient. Patient encouraged to perform monthly self skin exams and educated on how to perform them. UV precautions reviewed with patient. Patient was asked about new or changing moles/lesions on body.     #Reviewed Sunscreen: Apply 20 minutes prior to going outdoors and reapply every two hours, when wet or sweating. We recommend using an SPF 30 or higher, and to use one that is water resistant.       Follow-up:  1-2 years for follow up full body skin exam, prn for new or changing lesions or new concerns    Ashley Grey CNP  Dermatology     ____________________________________________    CC: Skin Check    HPI:  Ms. Jono Gillis is a(n) 34 year old female who presents today as a new patient for a full body skin cancer screening. Patient has concerns today about some darkened spots she had on the lower legs.  She states that she had a fungal infection about a year ago and it left some marks on the lower legs.  She wonders if there is still an  infection there or if this is just left over from the rash..     Patient is otherwise feeling well, without additional skin concerns.     Physical Exam:  Vitals: LMP 06/25/2024 (Exact Date)   Breastfeeding No   SKIN: Total skin excluding the genitalia areas was performed. The exam included the head/face, neck, both arms, chest, back, abdomen, both legs, digits, mons pubis, buttock and nails.   -A few patches of ill-defined light brown hyperpigmentation on the anterior lower legs  -several 1-2mm red dome shaped symmetric papules scattered on the trunk  -multiple tan/brown flat round macules and raised papules scattered throughout trunk, extremities and head. No worrisome features for malignancy noted on examination.  -scattered tan, homogenous macules scattered on sun exposed areas of trunk, extremities and face.   -scattered waxy, stuck on tan/brown small papules on trunk face and extremities    - No other lesions of concern on areas examined.     Medications:  Current Outpatient Medications   Medication Sig Dispense Refill    drospirenone (SLYND) 4 MG TABS tablet Take 1 tablet by mouth daily      ketorolac (TORADOL) 10 MG tablet Take 1 tablet (10 mg) by mouth every 6 hours as needed for moderate pain (Patient not taking: Reported on 9/4/2024) 20 tablet 0    ondansetron (ZOFRAN ODT) 4 MG ODT tab Take 4 mg by mouth (Patient not taking: Reported on 7/25/2024)      oxyBUTYnin ER (DITROPAN XL) 5 MG 24 hr tablet Take 1 tablet (5 mg) by mouth daily (Patient not taking: Reported on 9/4/2024) 14 tablet 0    phenazopyridine (PYRIDIUM) 200 MG tablet Take 1 tablet (200 mg) by mouth 3 times daily as needed (Patient not taking: Reported on 9/4/2024) 12 tablet 0    terazosin (HYTRIN) 2 MG capsule Take 1 capsule (2 mg) by mouth at bedtime (Patient not taking: Reported on 7/25/2024) 30 capsule 0    VITAMIN D PO  (Patient not taking: Reported on 9/4/2024)       No current facility-administered medications for this visit.      Facility-Administered Medications Ordered in Other Visits   Medication Dose Route Frequency Provider Last Rate Last Admin    acetaminophen (TYLENOL) tablet 975 mg  975 mg Oral Once Essie Ratliff MD        lactated ringers infusion   Intravenous Continuous Essie Ratliff MD        lidocaine (LMX4) kit   Topical Q1H PRN Essie Ratliff MD        lidocaine 1 % 0.1-1 mL  0.1-1 mL Other Q1H PRN Essie Ratliff MD        sodium chloride (PF) 0.9% PF flush 3 mL  3 mL Intracatheter Q8H Essie Ratliff MD        sodium chloride (PF) 0.9% PF flush 3 mL  3 mL Intracatheter q1 min prn Essie Ratliff MD          Past Medical History:   Patient Active Problem List   Diagnosis    Seasonal allergic rhinitis, unspecified trigger    Right ureteral calculus    Right ureteral stone    Familial hyperlipidemia    History of granulomatous disease     Past Medical History:   Diagnosis Date    Anxiety     homeopathic remedies, therapy    Depressive disorder N/A    I have had this for many years    Gastroesophageal reflux disease     Renal disease        CC Referred Self, MD  No address on file on close of this encounter.

## 2024-09-14 ENCOUNTER — ANCILLARY PROCEDURE (OUTPATIENT)
Dept: ULTRASOUND IMAGING | Facility: CLINIC | Age: 34
End: 2024-09-14
Attending: NURSE PRACTITIONER
Payer: COMMERCIAL

## 2024-09-14 DIAGNOSIS — N20.1 RIGHT URETERAL STONE: ICD-10-CM

## 2024-09-14 PROCEDURE — 76770 US EXAM ABDO BACK WALL COMP: CPT | Performed by: RADIOLOGY

## 2024-10-03 ENCOUNTER — E-VISIT (OUTPATIENT)
Dept: URGENT CARE | Facility: CLINIC | Age: 34
End: 2024-10-03
Payer: COMMERCIAL

## 2024-10-03 DIAGNOSIS — N30.01 ACUTE CYSTITIS WITH HEMATURIA: Primary | ICD-10-CM

## 2024-10-03 DIAGNOSIS — Z29.9 PROPHYLACTIC MEASURE: ICD-10-CM

## 2024-10-03 DIAGNOSIS — R35.0 URINARY FREQUENCY: ICD-10-CM

## 2024-10-03 PROCEDURE — 99421 OL DIG E/M SVC 5-10 MIN: CPT | Performed by: PREVENTIVE MEDICINE

## 2024-10-03 NOTE — PATIENT INSTRUCTIONS
Dear Jono Gillis,     After reviewing your responses, I would like you to come in for a urine test to make sure we treat you correctly. This urine test is to evaluate you for a possible urinary tract infection, and should be scheduled for today or tomorrow. Schedule a Lab Only appointment here.     Lab appointments are not available at most locations on the weekends. If no Lab Only appointment is available, you should be seen in any of our convenient Walk-in or Urgent Care Centers, which can be found on our website here.     You will receive instructions with your results in teextee once they are available.     If your symptoms worsen, you develop pain in your back or stomach, develop fevers, or are not improving in 5 days, please contact your primary care provider for an appointment or visit a Walk-in or Urgent Care Center to be seen.     Thanks again for choosing us as your health care partner,     Jarvis Gaviria MD, MD

## 2024-10-04 ENCOUNTER — LAB (OUTPATIENT)
Dept: LAB | Facility: CLINIC | Age: 34
End: 2024-10-04
Payer: COMMERCIAL

## 2024-10-04 DIAGNOSIS — R35.0 URINARY FREQUENCY: ICD-10-CM

## 2024-10-04 DIAGNOSIS — E78.49 FAMILIAL HYPERLIPIDEMIA: Primary | ICD-10-CM

## 2024-10-04 LAB
ALBUMIN UR-MCNC: NEGATIVE MG/DL
APPEARANCE UR: ABNORMAL
BACTERIA #/AREA URNS HPF: ABNORMAL /HPF
BILIRUB UR QL STRIP: NEGATIVE
COLOR UR AUTO: YELLOW
GLUCOSE UR STRIP-MCNC: NEGATIVE MG/DL
HGB UR QL STRIP: ABNORMAL
KETONES UR STRIP-MCNC: NEGATIVE MG/DL
LEUKOCYTE ESTERASE UR QL STRIP: ABNORMAL
MUCOUS THREADS #/AREA URNS LPF: PRESENT /LPF
NITRATE UR QL: NEGATIVE
PH UR STRIP: 7 [PH] (ref 5–7)
RBC #/AREA URNS AUTO: ABNORMAL /HPF
SP GR UR STRIP: 1.02 (ref 1–1.03)
SQUAMOUS #/AREA URNS AUTO: ABNORMAL /LPF
UROBILINOGEN UR STRIP-ACNC: 0.2 E.U./DL
WBC #/AREA URNS AUTO: ABNORMAL /HPF

## 2024-10-04 PROCEDURE — 81001 URINALYSIS AUTO W/SCOPE: CPT

## 2024-10-04 PROCEDURE — 87086 URINE CULTURE/COLONY COUNT: CPT

## 2024-10-04 RX ORDER — FLUCONAZOLE 150 MG/1
150 TABLET ORAL ONCE
Qty: 1 TABLET | Refills: 0 | Status: SHIPPED | OUTPATIENT
Start: 2024-10-04 | End: 2024-10-04

## 2024-10-04 RX ORDER — SULFAMETHOXAZOLE AND TRIMETHOPRIM 800; 160 MG/1; MG/1
1 TABLET ORAL 2 TIMES DAILY
Qty: 6 TABLET | Refills: 0 | Status: SHIPPED | OUTPATIENT
Start: 2024-10-04 | End: 2024-10-07

## 2024-10-05 LAB — BACTERIA UR CULT: NORMAL

## 2024-10-14 ENCOUNTER — MYC MEDICAL ADVICE (OUTPATIENT)
Dept: FAMILY MEDICINE | Facility: CLINIC | Age: 34
End: 2024-10-14
Payer: COMMERCIAL

## 2024-10-15 ENCOUNTER — VIRTUAL VISIT (OUTPATIENT)
Dept: FAMILY MEDICINE | Facility: CLINIC | Age: 34
End: 2024-10-15
Payer: COMMERCIAL

## 2024-10-15 DIAGNOSIS — N89.8 VAGINAL ITCHING: ICD-10-CM

## 2024-10-15 DIAGNOSIS — R35.0 URINARY FREQUENCY: Primary | ICD-10-CM

## 2024-10-15 PROCEDURE — 99213 OFFICE O/P EST LOW 20 MIN: CPT | Mod: 95 | Performed by: NURSE PRACTITIONER

## 2024-10-15 NOTE — PROGRESS NOTES
"Jono is a 34 year old who is being evaluated via a billable video visit.    How would you like to obtain your AVS? MyChart  If the video visit is dropped, the invitation should be resent by: Send to e-mail at: [email or web address]  Will anyone else be joining your video visit? No      Assessment & Plan     Urinary frequency    - UA with Microscopic reflex to Culture - lab collect; Future    Vaginal itching    - Wet prep - lab collect; Future    Video visit today so not able to do examination.  She recently took Bactirm for a urinary tract infection which she finished eight days ago.  She is now having some urinary frequency and vaginal itching.  Recommend getting urine and vaginal testing to evaluate for infections.      Estimated body mass index is 31.95 kg/m  as calculated from the following:    Height as of 7/25/24: 1.702 m (5' 7\").    Weight as of 7/25/24: 92.5 kg (204 lb).           Subjective   Jono is a 34 year old, presenting for the following health issues:  UTI (Symptoms have not gotten better, since 10/06/24.  /)    HPI     Genitourinary - Female  Onset/Duration: 9/20/24   Description:   Painful urination (Dysuria): YES, that symptoms is better since 10/04/2024. Antibiotic she took Bactrim DS twice daily for 3 days.  Also took Fluconazole 150 mg x1.   Frequency: YES  Blood in urine (Hematuria): No  Delay in urine (Hesitency): YES  Intensity: mild  Progression of Symptoms:  same and constant  Accompanying Signs & Symptoms:  Fever/chills: No  Flank pain: No  Nausea and vomiting: No  Vaginal symptoms: none and itching  Abdominal/Pelvic Pain: No  History:   History of frequent UTI s: No  History of kidney stones: YES- 7/2024.   Sexually Active: NO   Possibility of pregnancy: No  Precipitating or alleviating factors: cranberry juice and AZO help  Therapies tried and outcome:  used Antibiotic, bactrim DS, 10/05 to 10/07, caused itching?  Maybe an allergy      Additional provider notes:    Had a " kidney stone this past July 2024 ti was painful.  Seen by Urology.  Then in September got a UTI.  Sensation of needing to urinate but not being able to urinate much or just a little bit.  Associated burning feeling.  Symptoms would come and go.  While on the Bactrim she experienced itching, more internal itching.  But no rash.  After the Bactrim was finished she felt better.  Then this past Friday 10/11/24 she felt some vaginal symptoms itch/burn, no vaginal discharge.  So she took Fluconazole 150 mg on 10/11/24 (4 days ago).  Yesterday afternoon/evening had urinary symptoms return (urinary frequency).    Breakthrough bleeding on oral birth control.    Did a broth/smoothie cleanse for 4 days; felt good.  Then had pizza after that.  Wondering if that disrupted anything.          Objective           Vitals:  No vitals were obtained today due to virtual visit.    Physical Exam   Constitutional:  alert and cooperative.  Patient is speaking in full sentences.  No signs of distress.  Psychiatric: Alert and oriented times 3; coherent speech, normal rate and volume, able to articulate logical thoughts, able to abstract reason, no tangential thoughts, mood appropriate  Respiratory: No cough, no audible wheezing, able to talk in full sentences  Remainder of exam unable to be completed due to telephone visits           Video-Visit Details  Joined the call at 10/15/2024, 1:31:50 pm.  Left the call at 10/15/2024, 2:03:16 pm.  You were on the call for 31 minutes 26 seconds .  Type of service:  Video Visit   Originating Location (pt. Location): Home    Distant Location (provider location):  Off-site  Platform used for Video Visit: Jackson  Signed Electronically by: Yvette Groves NP

## 2024-10-15 NOTE — TELEPHONE ENCOUNTER
RN replied to patient via [employer]. See message for details.     Joseph Smith RN, BSN, PHN  [facility]: [place]

## 2024-10-18 ENCOUNTER — MYC MEDICAL ADVICE (OUTPATIENT)
Dept: FAMILY MEDICINE | Facility: CLINIC | Age: 34
End: 2024-10-18

## 2024-10-18 ENCOUNTER — LAB (OUTPATIENT)
Dept: LAB | Facility: CLINIC | Age: 34
End: 2024-10-18
Payer: COMMERCIAL

## 2024-10-18 DIAGNOSIS — R35.0 URINARY FREQUENCY: ICD-10-CM

## 2024-10-18 DIAGNOSIS — N89.8 VAGINAL ITCHING: ICD-10-CM

## 2024-10-18 LAB
ALBUMIN UR-MCNC: NEGATIVE MG/DL
APPEARANCE UR: CLEAR
BACTERIA #/AREA URNS HPF: ABNORMAL /HPF
BILIRUB UR QL STRIP: NEGATIVE
CLUE CELLS: ABNORMAL
COLOR UR AUTO: YELLOW
GLUCOSE UR STRIP-MCNC: NEGATIVE MG/DL
HGB UR QL STRIP: NEGATIVE
KETONES UR STRIP-MCNC: NEGATIVE MG/DL
LEUKOCYTE ESTERASE UR QL STRIP: ABNORMAL
NITRATE UR QL: NEGATIVE
PH UR STRIP: 7.5 [PH] (ref 5–7)
RBC #/AREA URNS AUTO: ABNORMAL /HPF
SP GR UR STRIP: 1.02 (ref 1–1.03)
SQUAMOUS #/AREA URNS AUTO: ABNORMAL /LPF
TRICHOMONAS, WET PREP: ABNORMAL
UROBILINOGEN UR STRIP-ACNC: 0.2 E.U./DL
WBC #/AREA URNS AUTO: ABNORMAL /HPF
WBC'S/HIGH POWER FIELD, WET PREP: ABNORMAL
YEAST, WET PREP: ABNORMAL

## 2024-10-18 PROCEDURE — 81001 URINALYSIS AUTO W/SCOPE: CPT

## 2024-10-18 PROCEDURE — 87210 SMEAR WET MOUNT SALINE/INK: CPT

## 2024-10-21 NOTE — TELEPHONE ENCOUNTER
Routing to Yvette    Patient seen on 10/18 and got lab results back. She has a few additional questions regarding her results - see [employer] message.    Please review and advise    Alessandra Dominguez RN

## 2024-11-15 ENCOUNTER — TELEPHONE (OUTPATIENT)
Dept: UROLOGY | Facility: CLINIC | Age: 34
End: 2024-11-15
Payer: COMMERCIAL

## 2024-11-22 ENCOUNTER — LAB (OUTPATIENT)
Dept: LAB | Facility: CLINIC | Age: 34
End: 2024-11-22
Payer: COMMERCIAL

## 2024-11-22 DIAGNOSIS — R30.0 DYSURIA: ICD-10-CM

## 2024-11-22 DIAGNOSIS — E78.49 FAMILIAL HYPERLIPIDEMIA: ICD-10-CM

## 2024-11-22 LAB
ALBUMIN UR-MCNC: NEGATIVE MG/DL
APPEARANCE UR: CLEAR
BACTERIA #/AREA URNS HPF: ABNORMAL /HPF
BILIRUB UR QL STRIP: NEGATIVE
CHOLEST SERPL-MCNC: 201 MG/DL
COLOR UR AUTO: YELLOW
FASTING STATUS PATIENT QL REPORTED: YES
GLUCOSE UR STRIP-MCNC: NEGATIVE MG/DL
HDLC SERPL-MCNC: 52 MG/DL
HGB UR QL STRIP: NEGATIVE
KETONES UR STRIP-MCNC: NEGATIVE MG/DL
LDLC SERPL CALC-MCNC: 140 MG/DL
LEUKOCYTE ESTERASE UR QL STRIP: ABNORMAL
NITRATE UR QL: NEGATIVE
NONHDLC SERPL-MCNC: 149 MG/DL
PH UR STRIP: 7.5 [PH] (ref 5–7)
RBC #/AREA URNS AUTO: ABNORMAL /HPF
SP GR UR STRIP: 1.02 (ref 1–1.03)
TRIGL SERPL-MCNC: 47 MG/DL
UROBILINOGEN UR STRIP-ACNC: 0.2 E.U./DL
WBC #/AREA URNS AUTO: ABNORMAL /HPF

## 2024-11-22 PROCEDURE — 81001 URINALYSIS AUTO W/SCOPE: CPT

## 2024-11-22 PROCEDURE — 87086 URINE CULTURE/COLONY COUNT: CPT

## 2024-11-22 PROCEDURE — 36415 COLL VENOUS BLD VENIPUNCTURE: CPT

## 2024-11-22 PROCEDURE — 80061 LIPID PANEL: CPT

## 2024-11-23 LAB — BACTERIA UR CULT: NORMAL

## 2025-01-16 ENCOUNTER — OFFICE VISIT (OUTPATIENT)
Dept: FAMILY MEDICINE | Facility: CLINIC | Age: 35
End: 2025-01-16
Payer: COMMERCIAL

## 2025-01-16 ENCOUNTER — NURSE TRIAGE (OUTPATIENT)
Dept: FAMILY MEDICINE | Facility: CLINIC | Age: 35
End: 2025-01-16

## 2025-01-16 VITALS
BODY MASS INDEX: 31.39 KG/M2 | WEIGHT: 200 LBS | TEMPERATURE: 99.1 F | RESPIRATION RATE: 20 BRPM | OXYGEN SATURATION: 98 % | DIASTOLIC BLOOD PRESSURE: 80 MMHG | SYSTOLIC BLOOD PRESSURE: 118 MMHG | HEIGHT: 67 IN | HEART RATE: 106 BPM

## 2025-01-16 DIAGNOSIS — J10.1 INFLUENZA A: Primary | ICD-10-CM

## 2025-01-16 DIAGNOSIS — R05.1 ACUTE COUGH: ICD-10-CM

## 2025-01-16 DIAGNOSIS — J02.9 SORE THROAT: ICD-10-CM

## 2025-01-16 LAB
DEPRECATED S PYO AG THROAT QL EIA: NEGATIVE
FLUAV AG SPEC QL IA: POSITIVE
FLUBV AG SPEC QL IA: NEGATIVE
S PYO DNA THROAT QL NAA+PROBE: NOT DETECTED

## 2025-01-16 RX ORDER — BENZONATATE 100 MG/1
100 CAPSULE ORAL 3 TIMES DAILY PRN
Qty: 30 CAPSULE | Refills: 0 | Status: SHIPPED | OUTPATIENT
Start: 2025-01-16

## 2025-01-16 ASSESSMENT — ENCOUNTER SYMPTOMS: SORE THROAT: 1

## 2025-01-16 ASSESSMENT — PAIN SCALES - GENERAL: PAINLEVEL_OUTOF10: SEVERE PAIN (6)

## 2025-01-16 NOTE — TELEPHONE ENCOUNTER
Pt calling in concerns to fever, sore throat, and body aches that have been continuing since Monday 1/13.    The sore throat started Sunday night with a tickle in the throat, but pt says she has had a fever every day since Monday. This morning fever was 101. She has been using Advil cold and flu to reduce the fever. The fever has responded to the medication. She test for COVID x2 in last week both negative, no known exposure to influenza.     Pt says that her throat is the worst symptom she is experiencing, she says that talking is okay, but has to whisper, (a hoarse voice is heard over the phone), pt states that swallowing is extremely difficult. Pt denies any difficulty breathing. Pt denies that one side of throat hurts more than the other, there is no pus on the tonsils and pt is unable to tell whether the tonsils are enlarged. Pt states no known exposure to strep in last week. Pt has a slight cough, runny nose. Pt is urinating and drinking appropriately.     RN recommend that pt be seen today or tomorrow and pt agreeable to the plan. RN helped to schedule the pt for today 1/16 at 9:40 with Kary Palacios at [place]. RN advised that if pt develops difficulty breathing, chest pain or blue lips she needs to go to the emergency department of call 911. Pt verbalized understanding.     RN also recommended to continue taking OTC Advil cold and flu medication as directed and also recommended to increase fluids (drinking lemon, honey and warm water to sooth throat), doing salt water gargle 4x/day, drinking Gatorade.     Reason for Disposition   Fever present > 3 days (72 hours)    Additional Information   Negative: SEVERE difficulty breathing (e.g., struggling for each breath, speaks in single words)   Negative: Sounds like a life-threatening emergency to the triager   Negative: Throat culture results, call about   Negative: Productive cough is main symptom   Negative: Runny nose is main symptom   Negative: Difficulty breathing  "(per caller) but not severe   Negative: Drooling or spitting out saliva (because can't swallow)   Negative: Unable to open mouth completely   Negative: Drinking very little and has signs of dehydration (e.g., no urine > 12 hours, very dry mouth, very lightheaded)   Negative: Patient sounds very sick or weak to the triager   Negative: Fever > 103 F (39.4 C)   Negative: Refuses to drink anything for > 12 hours   Negative: SEVERE sore throat pain   Negative: Pus on tonsils (back of throat) and swollen neck lymph nodes ('glands')   Negative: Earache also present   Negative: Widespread rash (especially chest and abdomen)   Negative: Diabetes mellitus or weak immune system (e.g., HIV positive, cancer chemo, splenectomy, organ transplant, chronic steroids)   Negative: History of rheumatic fever   Negative: Patient wants to be seen    Answer Assessment - Initial Assessment Questions  1. ONSET: \"When did the throat start hurting?\" (Hours or days ago)       Monday (Sunday night started feeling tickle in thraot)  2. SEVERITY: \"How bad is the sore throat?\" (Scale 1-10; mild, moderate or severe)      5-6/10  3. STREP EXPOSURE: \"Has there been any exposure to strep within the past week?\" If Yes, ask: \"What type of contact occurred?\"       Not aware  4.  VIRAL SYMPTOMS: \"Are there any symptoms of a cold, such as a runny nose, cough, hoarse voice or red eyes?\"       Cough, hoarse voice (wispering, hurts to talk, hurts to swallow), a little bit of runny nose  5. FEVER: \"Do you have a fever?\" If Yes, ask: \"What is your temperature, how was it measured, and when did it start?\"      Yes,   6. PUS ON THE TONSILS: \"Is there pus on the tonsils in the back of your throat?\"      Denies pus  7. OTHER SYMPTOMS: \"Do you have any other symptoms?\" (e.g., difficulty breathing, headache, rash)      Denies difficulty elysia  8. PREGNANCY: \"Is there any chance you are pregnant?\" \"When was your last menstrual period?\"      N/a, Just had it around " 1/9    Protocols used: Sore Throat-A-[place]    Gayle Copeland RN

## 2025-01-16 NOTE — PROGRESS NOTES
"  Assessment & Plan     Influenza A  Patient positive for influenza A. Patient is outside timeframe for treatment with Tamiflu.  I recommend lots of fluids, rest, humidifier in bedroom, steam showers, otc tylenol or ibuprofen for fevers and body aches. Warning signs to go to ER educated to patient today. Symptoms should improve over the next 3-4 days. If symptoms do not improve or worsen please follow up sooner. Parent agree's with this plan and has no further questions      Sore throat  Strep negative. Likely due to influenza  - Streptococcus A Rapid Screen w/Reflex to PCR - Clinic Collect  - Group A Streptococcus PCR Throat Swab    Acute cough  COVID is pending. Sending in cough suppressant to help with influenza symptoms. Side effects discussed.  - Influenza A & B Antigen - Clinic Collect  - COVID-19 Virus (Coronavirus) by PCR Nose  - benzonatate (TESSALON) 100 MG capsule; Take 1 capsule (100 mg) by mouth 3 times daily as needed for cough.          BMI  Estimated body mass index is 31.32 kg/m  as calculated from the following:    Height as of this encounter: 1.702 m (5' 7\").    Weight as of this encounter: 90.7 kg (200 lb).             Leandro De La Cruz is a 35 year old, presenting for the following health issues:  Pharyngitis        1/16/2025     9:40 AM   Additional Questions   Roomed by Chloe HUGHES     History of Present Illness       Reason for visit:  Not feeling well  Symptom onset:  3-7 days ago  Symptoms include:  Fever, aches, body pain, sore throat, cough  Symptom intensity:  Moderate  Symptom progression:  Staying the same  Had these symptoms before:  No  What makes it worse:  When I am unable to sleep due to my cough  What makes it better:  Advil Cold and Sinus and sleeping   She is taking medications regularly.       Acute Illness  Acute illness concerns: Sore throat, fever  Onset/Duration: Sunday night   Symptoms:  Fever: YES- 102  Chills/Sweats: YES  Headache (location?): No  Sinus Pressure: " "No  Conjunctivitis:  No  Ear Pain: no  Rhinorrhea: YES  Congestion: YES  Sore Throat: YES  Cough: YES-non-productive, productive of hazy sputum  Wheeze: No  Decreased Appetite: No  Nausea: No- more gassy  Vomiting: No  Diarrhea: No  Dysuria/Freq.: No  Dysuria or Hematuria: No  Fatigue/Achiness: YES  Sick/Strep Exposure: No  Therapies tried and outcome:  Advil cold and sinus, rest -- home covid tests x 2 negative             Review of Systems  Constitutional, HEENT, cardiovascular, pulmonary, gi and gu systems are negative, except as otherwise noted.      Objective    /80   Pulse 106   Temp 99.1  F (37.3  C) (Oral)   Resp 20   Ht 1.702 m (5' 7\")   Wt 90.7 kg (200 lb)   LMP 01/09/2025   SpO2 98%   BMI 31.32 kg/m    Body mass index is 31.32 kg/m .  Physical Exam               Signed Electronically by: [facility] Powers    "

## 2025-03-09 ENCOUNTER — HEALTH MAINTENANCE LETTER (OUTPATIENT)
Age: 35
End: 2025-03-09

## 2025-05-05 ENCOUNTER — NURSE TRIAGE (OUTPATIENT)
Dept: FAMILY MEDICINE | Facility: CLINIC | Age: 35
End: 2025-05-05

## 2025-05-05 ENCOUNTER — VIRTUAL VISIT (OUTPATIENT)
Dept: FAMILY MEDICINE | Facility: CLINIC | Age: 35
End: 2025-05-05
Payer: COMMERCIAL

## 2025-05-05 DIAGNOSIS — M25.552 HIP PAIN, LEFT: Primary | ICD-10-CM

## 2025-05-05 PROCEDURE — 98005 SYNCH AUDIO-VIDEO EST LOW 20: CPT | Performed by: PHYSICIAN ASSISTANT

## 2025-05-05 NOTE — TELEPHONE ENCOUNTER
I will see the patient virtually. If I feel like its inappropriate will have patient schedule an in person appointment    Thank you,  Kary Ramsey PA-C

## 2025-05-05 NOTE — TELEPHONE ENCOUNTER
"Kary,    Patient called regarding her myhcart message:     \"I wanted to reach out to see about coming in, or potentially having some kind of scan done. For the past 2.5/3 weeks I had this hip pain that came out of nowhere. It has slowly improved but has not gone away. It is on my left hip, and it sometimes radiates into my lower back. I just noticed today that there is some slight swelling on my left lower abdomen. Anyway, thoughts? I am not experiencing any other symptoms at the moment besides the coming and going pain. Thanks so much for the help! \"    Swelling in left abd noticed today. Not taking anything OTC. Pain in hip was getting better but today noticed the abdominal swelling. Unsure of cause. No known injury.    Patient would be fine with VV but reaching out to see if ok to switch to in person. Please advise.       Reason for Disposition   MODERATE pain (e.g., interferes with normal activities that comes and goes (cramps) lasts > 24 hours  (Exception: Pain with Vomiting or Diarrhea - see that Protocol.)    Additional Information   Negative: Passed out (e.g., fainted, lost consciousness, blacked out and was not responding)   Negative: Shock suspected (e.g., cold/pale/clammy skin, too weak to stand, low BP, rapid pulse)   Negative: Sounds like a life-threatening emergency to the triager   Negative: SEVERE abdominal pain (e.g., excruciating)   Negative: Vomiting red blood or black (coffee ground) material   Negative: Blood in bowel movements  (Exception: Blood on surface of BM with constipation.)   Negative: Black or tarry bowel movements  (Exception: Chronic-unchanged black-grey BMs AND is taking iron pills or Pepto-Bismol.)   Negative: MILD TO MODERATE constant pain lasting > 2 hours   Negative: MILD TO MODERATE constant pain lasting > 2 hours, and age > 60 years   Negative: Vomiting bile (green color)   Negative: Patient sounds very sick or weak to the triager   Negative: Vomiting and abdomen looks much " more swollen than usual   Negative: White of the eyes have turned yellow (i.e., jaundice)   Negative: Blood in urine (red, pink, or tea-colored)   Negative: Fever > 103 F (39.4 C)   Negative: Fever > 101 F (38.3 C) and over 60 years of age   Negative: Fever > 100 F (37.8 C) and has diabetes mellitus or a weak immune system (e.g., HIV positive, cancer chemotherapy, organ transplant, splenectomy, chronic steroids)   Negative: Fever > 100 F (37.8 C) and bedridden (e.g., CVA, chronic illness, recovering from surgery)    Protocols used: Abdominal Pain - Female-A-[place]    Linda Meraz RN  [facility]

## 2025-05-05 NOTE — PROGRESS NOTES
"Jono is a 35 year old who is being evaluated via a billable video visit.    How would you like to obtain your AVS? MyChart  If the video visit is dropped, the invitation should be resent by: Text to cell phone: [phone]  Will anyone else be joining your video visit? No      Assessment & Plan     Hip pain, left  Patient presents for virtual visit for some anterior and lateral hip pain with subjective swelling over the area into the lower abdomin. Due to virtual visit unable to evaluate with exam or confirm the swelling. Xray read by radiology saw no abnormalities. Differential includes but not limited to bursitis, hip flexor tendonitis, inguinal hernia, labral tear, swollen lymph node. I suspect more musculoskeletal in nature as pain seems to worsen with movement such as long walks. Conservative treatment with ice, stretches and otc pain relievers recommend for the next 1-2 weeks. If no improvement then patient would need to be seen in clinic to do more evaluation. Patient agree's with this plan and has no further questions  - XR Pelvis and Hip Left 1 View; Future          BMI  Estimated body mass index is 31.32 kg/m  as calculated from the following:    Height as of 1/16/25: 1.702 m (5' 7\").    Weight as of 1/16/25: 90.7 kg (200 lb).             Leandro De La Cruz is a 35 year old, presenting for the following health issues:  No chief complaint on file.        5/5/2025     2:02 PM   Additional Questions   Roomed by Conchis   Accompanied by none         5/5/2025     2:02 PM   Patient Reported Additional Medications   Patient reports taking the following new medications none     History of Present Illness       Reason for visit:  Pain in hip   She is taking medications regularly.        Concern - Left side hip pain, no known injury, patient did add a standing/walking desk at home and was using it a lot initially   Onset: 2-3 weeks or so  Description: slight swelling in the abdominal area where her hip is. Has " a dull ache and soreness   Intensity: mild to moderate   Progression of Symptoms:  waxing and waning, still has pain but has improved but the swelling in worse and just noticed the swelling   Accompanying Signs & Symptoms: none  Previous history of similar problem: none  Precipitating factors:        Worsened by: when she was using her standing/walking desk. When she is walking for longer periods of time she will notice, if she stretches her body in a certain way  Alleviating factors:        Improved by: IBU which helps a little  Therapies tried and outcome: IBU  Additional provider notes :   No bowel changes . No changes in menses       Review of Systems  Constitutional, HEENT, cardiovascular, pulmonary, gi and gu systems are negative, except as otherwise noted.      Objective           Vitals:  No vitals were obtained today due to virtual visit.    Physical Exam   GENERAL: alert and no distress  EYES: Eyes grossly normal to inspection.  No discharge or erythema, or obvious scleral/conjunctival abnormalities.  RESP: No audible wheeze, cough, or visible cyanosis.    SKIN: Visible skin clear. No significant rash, abnormal pigmentation or lesions.  NEURO: Cranial nerves grossly intact.  Mentation and speech appropriate for age.  PSYCH: Appropriate affect, tone, and pace of words  Ortho : left hip, patient points to anterior slightly lateral area of hip joint to  where she is having pain     XR Pelvis and Hip Left 1 View    Result Date: 5/6/2025  EXAM: XR PELVIS AND HIP LEFT 1 VIEW LOCATION: [facility] DATE: 5/6/2025 INDICATION:  Hip pain, left COMPARISON: None.     IMPRESSION: Both hips are negative for fracture or dislocation. Pelvis negative for fracture.       Video-Visit Details    Type of service:  Video Visit   Originating Location (pt. Location): Home    Distant Location (provider location):  Off-site  Platform used for Video Visit: Jackson  Signed Electronically by: Kary Ramsey  PA

## 2025-05-06 ENCOUNTER — ANCILLARY PROCEDURE (OUTPATIENT)
Dept: GENERAL RADIOLOGY | Facility: CLINIC | Age: 35
End: 2025-05-06
Attending: PHYSICIAN ASSISTANT
Payer: COMMERCIAL

## 2025-05-06 DIAGNOSIS — M25.552 HIP PAIN, LEFT: ICD-10-CM

## 2025-05-06 PROCEDURE — 73502 X-RAY EXAM HIP UNI 2-3 VIEWS: CPT | Mod: TC | Performed by: RADIOLOGY

## 2025-06-22 ENCOUNTER — NURSE TRIAGE (OUTPATIENT)
Dept: NURSING | Facility: CLINIC | Age: 35
End: 2025-06-22
Payer: COMMERCIAL

## 2025-06-22 NOTE — TELEPHONE ENCOUNTER
Nurse Triage SBAR    Is this a 2nd Level Triage? NO    Situation: patient calling.     Background: Insect bite.     Assessment: Pt has a spider bite on her upper left arm which she noticed last week.  It is hot, red, raised, itchy.  No fever.     Protocol Recommended Disposition:   See PCP Within 24 Hours    Recommendation: See provider in clinic within 24 hours, UC if no appt available.           Does the patient meet one of the following criteria for ADS visit consideration? 16+ years old, with an [facility] PCP     TIP  Providers, please consider if this condition is appropriate for management at one of our Acute and Diagnostic Services sites.     If patient is a good candidate, please use dotphrase <dot>triageresponse and select Refer to ADS to document.    Reason for Disposition   [1] Red or very tender (to touch) area AND [2] getting larger over 48 hours after the bite    Additional Information   Negative: [1] Life-threatening reaction (anaphylaxis) in the past to bite from same insect AND [2] < 2 hours since bite   Negative: Passed out (i.e., lost consciousness, collapsed and was not responding)   Negative: Difficulty breathing or wheezing   Negative: [1] Hoarseness or cough AND [2] sudden onset following bite   Negative: [1] Difficulty swallowing or slurred speech AND [2] sudden onset following bite   Negative: Sounds like a life-threatening emergency to the triager   Negative: Patient sounds very sick or weak to the triager   Negative: [1] SEVERE bite pain AND [2] not improved after 2 hours of pain medicine   Negative: [1] Fever AND [2] red area   Negative: [1] Fever AND [2] area is very tender to touch   Negative: [1] Red streak or red line AND [2] length > 2 inches (5 cm)   Negative: [1] Red or very tender (to touch) area AND [2] started over 24 hours after the bite    Protocols used: Insect Bite-A-AH     36.7

## 2025-06-23 ENCOUNTER — OFFICE VISIT (OUTPATIENT)
Dept: FAMILY MEDICINE | Facility: CLINIC | Age: 35
End: 2025-06-23
Payer: COMMERCIAL

## 2025-06-23 VITALS
HEART RATE: 92 BPM | SYSTOLIC BLOOD PRESSURE: 126 MMHG | TEMPERATURE: 98.2 F | RESPIRATION RATE: 16 BRPM | HEIGHT: 67 IN | DIASTOLIC BLOOD PRESSURE: 84 MMHG | BODY MASS INDEX: 31.39 KG/M2 | OXYGEN SATURATION: 100 % | WEIGHT: 200 LBS

## 2025-06-23 DIAGNOSIS — L03.114 CELLULITIS OF LEFT UPPER EXTREMITY: Primary | ICD-10-CM

## 2025-06-23 DIAGNOSIS — R23.4 BREAST SKIN CHANGES: ICD-10-CM

## 2025-06-23 DIAGNOSIS — L03.114 CELLULITIS OF LEFT UPPER EXTREMITY: ICD-10-CM

## 2025-06-23 DIAGNOSIS — Z12.4 CERVICAL CANCER SCREENING: Primary | ICD-10-CM

## 2025-06-23 PROCEDURE — 3079F DIAST BP 80-89 MM HG: CPT

## 2025-06-23 PROCEDURE — 99213 OFFICE O/P EST LOW 20 MIN: CPT

## 2025-06-23 PROCEDURE — 3074F SYST BP LT 130 MM HG: CPT

## 2025-06-23 RX ORDER — DOXYCYCLINE 100 MG/1
100 CAPSULE ORAL 2 TIMES DAILY
Qty: 14 CAPSULE | Refills: 0 | Status: SHIPPED | OUTPATIENT
Start: 2025-06-23 | End: 2025-06-30

## 2025-06-23 RX ORDER — FLUCONAZOLE 150 MG/1
150 TABLET ORAL ONCE
Qty: 3 TABLET | Refills: 0 | Status: SHIPPED | OUTPATIENT
Start: 2025-06-23 | End: 2025-06-23

## 2025-06-23 NOTE — PROGRESS NOTES
"  {PROVIDER CHARTING PREFERENCE:568627}    Leandro De La Cruz is a 35 year old, presenting for the following health issues:  Insect Bite        6/23/2025     3:07 PM   Additional Questions   Roomed by Jessi HUGHES MA   Accompanied by Self     History of Present Illness       Reason for visit:  Bug bite symptoms and reaction  Symptom onset:  3-7 days ago  Symptoms include:  Redness, itchy, spreading on left arm  Symptom intensity:  Moderate  Symptom progression:  Improving  Had these symptoms before:  No  What makes it worse:  Icing has caused it to spread  What makes it better:  Benadryl itch cream   She is taking medications regularly.      If prescribed abx, please prescribe antifungal for yeast infection.   Left arm and under left breast bite, warm to tough, itchiness, redness is reducing but it seems to be spreading.   Ice may have worsened bites.       {SUPERLIST (Optional):060916}  {additonal problems for provider to add (Optional):367765}    {ROS Picklists (Optional):174653}      Objective    /84 (BP Location: Right arm, Patient Position: Chair, Cuff Size: Adult Regular)   Pulse 92   Temp 98.2  F (36.8  C) (Oral)   Resp 16   Ht 1.702 m (5' 7\")   Wt 90.7 kg (200 lb)   LMP 05/23/2025 (Approximate)   SpO2 100%   Breastfeeding No   BMI 31.32 kg/m    Body mass index is 31.32 kg/m .  Physical Exam   {Exam List (Optional):633061}    {Diagnostic Test Results (Optional):738208}        Signed Electronically by: KRISTINE Horton CNP  {Email feedback regarding this note to [email or web address]   :570293}  " LMP 05/23/2025 (Approximate)   SpO2 100%   Breastfeeding No   BMI 31.32 kg/m    Body mass index is 31.32 kg/m .  Physical Exam   GENERAL: alert and no distress  NECK: no adenopathy, no asymmetry, masses, or scars  RESP: lungs clear to auscultation - no rales, rhonchi or wheezes  CV: regular rate and rhythm, normal S1 S2, no S3 or S4, no murmur, click or rub, no peripheral edema  ABDOMEN: soft, nontender, no hepatosplenomegaly, no masses and bowel sounds normal  MS: no gross musculoskeletal defects noted, no edema                  No results found for any visits on 06/23/25.        Signed Electronically by: KRISTINE Horton CNP

## 2025-06-23 NOTE — PATIENT INSTRUCTIONS
Start antibiotic (doxycycline), twice daily for 7 days.   Take with food and full glass of water. No laying down for 30 minutes.   Wear sunscreen, causes photosensitivity.   OK to take benadryl or cetirizine daily for itching    Schedule mammogram    Astelin (nasonex) - twice daily  - Flonase during allergy flares    Diflucan available if needed.

## 2025-06-24 ENCOUNTER — MYC MEDICAL ADVICE (OUTPATIENT)
Dept: FAMILY MEDICINE | Facility: CLINIC | Age: 35
End: 2025-06-24
Payer: COMMERCIAL

## 2025-06-24 NOTE — TELEPHONE ENCOUNTER
Routing to Angel Servin. Please see question below from patient.    KAM Raymond  [facility]

## 2025-06-26 NOTE — TELEPHONE ENCOUNTER
Routing to Angel Servin,  Please see question below from patient.    KAM Raymond  [facility]

## 2025-07-02 ENCOUNTER — RESULTS FOLLOW-UP (OUTPATIENT)
Dept: FAMILY MEDICINE | Facility: CLINIC | Age: 35
End: 2025-07-02

## 2025-07-02 ENCOUNTER — ANCILLARY PROCEDURE (OUTPATIENT)
Dept: MAMMOGRAPHY | Facility: CLINIC | Age: 35
End: 2025-07-02
Payer: COMMERCIAL

## 2025-07-02 ENCOUNTER — ANCILLARY PROCEDURE (OUTPATIENT)
Dept: ULTRASOUND IMAGING | Facility: CLINIC | Age: 35
End: 2025-07-02
Payer: COMMERCIAL

## 2025-07-02 DIAGNOSIS — R23.4 BREAST SKIN CHANGES: ICD-10-CM

## 2025-07-02 PROCEDURE — 76642 ULTRASOUND BREAST LIMITED: CPT | Mod: LT

## 2025-07-02 PROCEDURE — G0279 TOMOSYNTHESIS, MAMMO: HCPCS

## 2025-07-02 PROCEDURE — 77066 DX MAMMO INCL CAD BI: CPT
